# Patient Record
Sex: FEMALE | Race: WHITE | NOT HISPANIC OR LATINO | Employment: PART TIME | URBAN - METROPOLITAN AREA
[De-identification: names, ages, dates, MRNs, and addresses within clinical notes are randomized per-mention and may not be internally consistent; named-entity substitution may affect disease eponyms.]

---

## 2023-11-23 ENCOUNTER — APPOINTMENT (OUTPATIENT)
Dept: RADIOLOGY | Facility: HOSPITAL | Age: 63
End: 2023-11-23
Payer: COMMERCIAL

## 2023-11-23 ENCOUNTER — HOSPITAL ENCOUNTER (EMERGENCY)
Facility: HOSPITAL | Age: 63
Discharge: HOME | End: 2023-11-23
Payer: COMMERCIAL

## 2023-11-23 VITALS
HEART RATE: 54 BPM | WEIGHT: 160 LBS | SYSTOLIC BLOOD PRESSURE: 191 MMHG | RESPIRATION RATE: 16 BRPM | OXYGEN SATURATION: 96 % | TEMPERATURE: 97 F | HEIGHT: 67 IN | DIASTOLIC BLOOD PRESSURE: 91 MMHG | BODY MASS INDEX: 25.11 KG/M2

## 2023-11-23 DIAGNOSIS — I10 HYPERTENSION, UNSPECIFIED TYPE: ICD-10-CM
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
## 2023-11-23 DIAGNOSIS — M25.551 RIGHT HIP PAIN: ICD-10-CM

## 2023-11-23 DIAGNOSIS — M54.50 ACUTE RIGHT-SIDED LOW BACK PAIN WITHOUT SCIATICA: ICD-10-CM

## 2023-11-23 PROBLEM — I16.0 HYPERTENSIVE URGENCY: Status: ACTIVE | Noted: 2019-04-18

## 2023-11-23 PROCEDURE — 2500000001 HC RX 250 WO HCPCS SELF ADMINISTERED DRUGS (ALT 637 FOR MEDICARE OP): Performed by: NURSE PRACTITIONER

## 2023-11-23 PROCEDURE — 72100 X-RAY EXAM L-S SPINE 2/3 VWS: CPT | Performed by: RADIOLOGY

## 2023-11-23 PROCEDURE — 73502 X-RAY EXAM HIP UNI 2-3 VIEWS: CPT | Mod: RIGHT SIDE | Performed by: RADIOLOGY

## 2023-11-23 PROCEDURE — 72100 X-RAY EXAM L-S SPINE 2/3 VWS: CPT

## 2023-11-23 PROCEDURE — 99285 EMERGENCY DEPT VISIT HI MDM: CPT | Mod: 25

## 2023-11-23 PROCEDURE — 99284 EMERGENCY DEPT VISIT MOD MDM: CPT | Mod: 25

## 2023-11-23 PROCEDURE — 73502 X-RAY EXAM HIP UNI 2-3 VIEWS: CPT | Mod: RT

## 2023-11-23 RX ORDER — ACETAMINOPHEN 325 MG/1
650 TABLET ORAL ONCE
Status: COMPLETED | OUTPATIENT
Start: 2023-11-23 | End: 2023-11-23

## 2023-11-23 RX ORDER — IBUPROFEN 600 MG/1
600 TABLET ORAL ONCE
Status: COMPLETED | OUTPATIENT
Start: 2023-11-23 | End: 2023-11-23

## 2023-11-23 RX ORDER — NAPROXEN 375 MG/1
375 TABLET ORAL
Qty: 20 TABLET | Refills: 0 | Status: SHIPPED | OUTPATIENT
Start: 2023-11-23 | End: 2023-12-06 | Stop reason: WASHOUT

## 2023-11-23 RX ADMIN — ACETAMINOPHEN 650 MG: 325 TABLET ORAL at 18:39

## 2023-11-23 RX ADMIN — IBUPROFEN 600 MG: 600 TABLET, FILM COATED ORAL at 18:39

## 2023-11-23 ASSESSMENT — VISUAL ACUITY: OU: 1

## 2023-11-23 ASSESSMENT — COLUMBIA-SUICIDE SEVERITY RATING SCALE - C-SSRS
1. IN THE PAST MONTH, HAVE YOU WISHED YOU WERE DEAD OR WISHED YOU COULD GO TO SLEEP AND NOT WAKE UP?: NO
6. HAVE YOU EVER DONE ANYTHING, STARTED TO DO ANYTHING, OR PREPARED TO DO ANYTHING TO END YOUR LIFE?: NO
2. HAVE YOU ACTUALLY HAD ANY THOUGHTS OF KILLING YOURSELF?: NO

## 2023-11-23 NOTE — ED TRIAGE NOTES
Pt presents to ED after a fall 1 week ago for c/o right hip pain. Pt reports pain and difficulty walking. Pt reports popping sensation when walking up and down steps

## 2023-11-23 NOTE — ED PROVIDER NOTES
HPI   Chief Complaint   Patient presents with    Hip Pain       Patient is a 63-year-old female past medical history of hyperlipidemia, hypertension, and GERD, who presents ED today due to right hip pain.  Patient states she was sugar treating a few weeks ago and was bumped and tripped over a curb and fell onto her face and right side.  Patient has been complaining of right hip and back pain since that time but the pain became worse over the past few days so she wanted to get it evaluated.  Patient denies any distal numbness or weakness.  Patient denies any bowel or bladder retention.  Patient denies any saddle anesthesias.       used: No                        No data recorded                Patient History   Past Medical History:   Diagnosis Date    Hypertension      History reviewed. No pertinent surgical history.  No family history on file.  Social History     Tobacco Use    Smoking status: Every Day     Packs/day: .5     Types: Cigarettes    Smokeless tobacco: Never   Substance Use Topics    Alcohol use: Not Currently    Drug use: Never       Physical Exam   ED Triage Vitals [11/23/23 1825]   Temp Heart Rate Resp BP   36.6 °C (97.9 °F) 61 20 (!) 217/95      SpO2 Temp Source Heart Rate Source Patient Position   97 % Tympanic Monitor Sitting      BP Location FiO2 (%)     Right arm --       Physical Exam  Vitals and nursing note reviewed.   Constitutional:       General: She is not in acute distress.     Appearance: Normal appearance. She is not toxic-appearing or diaphoretic.      Interventions: Cervical collar in place.   HENT:      Head: Normocephalic and atraumatic. No raccoon eyes, Borges's sign, abrasion, contusion, right periorbital erythema, left periorbital erythema or laceration.      Jaw: No trismus, tenderness, swelling or malocclusion.      Right Ear: Tympanic membrane normal. No hemotympanum.      Left Ear: Tympanic membrane normal. No hemotympanum.      Nose: No nasal tenderness.       Right Nostril: No epistaxis or septal hematoma.      Left Nostril: No epistaxis or septal hematoma.      Mouth/Throat:      Mouth: Mucous membranes are moist. No injury.   Eyes:      General: Lids are normal. Vision grossly intact.      Extraocular Movements: Extraocular movements intact.      Right eye: Normal extraocular motion.      Left eye: Normal extraocular motion.      Conjunctiva/sclera:      Right eye: No hemorrhage.     Left eye: No hemorrhage.     Pupils: Pupils are equal, round, and reactive to light.   Cardiovascular:      Rate and Rhythm: Normal rate and regular rhythm.      Pulses:           Radial pulses are 2+ on the right side and 2+ on the left side.        Femoral pulses are 2+ on the right side and 2+ on the left side.       Dorsalis pedis pulses are 2+ on the right side and 2+ on the left side.        Posterior tibial pulses are 2+ on the right side and 2+ on the left side.   Pulmonary:      Effort: No respiratory distress.      Breath sounds: Normal breath sounds.   Abdominal:      General: Abdomen is flat.      Tenderness: There is no abdominal tenderness. There is no guarding or rebound.   Musculoskeletal:      Right shoulder: No swelling, deformity or tenderness.      Left shoulder: No swelling, deformity or tenderness.      Right upper arm: No swelling, deformity or tenderness.      Left upper arm: No swelling, deformity or tenderness.      Right elbow: No swelling or deformity. No tenderness.      Left elbow: No swelling or deformity. No tenderness.      Right forearm: No swelling, deformity or tenderness.      Left forearm: No swelling, deformity or tenderness.      Right wrist: No swelling, deformity or tenderness.      Left wrist: No swelling, deformity or tenderness.      Right hand: No swelling, deformity or tenderness.      Left hand: No swelling, deformity or tenderness.      Cervical back: No deformity, tenderness or bony tenderness. No spinous process tenderness.       Thoracic back: No deformity or bony tenderness.      Lumbar back: Tenderness (Right paraspinal plain of lumbar region) present. No deformity or bony tenderness. Negative right straight leg raise test and negative left straight leg raise test.      Right hip: Bony tenderness present. No deformity, tenderness or crepitus. Decreased range of motion. Normal strength.      Left hip: No deformity or tenderness.      Right upper leg: No deformity or tenderness.      Left upper leg: No deformity or tenderness.      Right knee: No deformity. No tenderness.      Left knee: No deformity. No tenderness.      Right lower leg: No deformity or tenderness.      Left lower leg: No deformity or tenderness.      Right ankle: No deformity. No tenderness.      Left ankle: No deformity. No tenderness.      Right foot: Normal capillary refill. No deformity or tenderness. Normal pulse.      Left foot: No deformity or tenderness.   Skin:     General: Skin is warm.      Findings: No abrasion or laceration.   Neurological:      Mental Status: She is alert.      GCS: GCS eye subscore is 4. GCS verbal subscore is 5. GCS motor subscore is 6.      Sensory: Sensation is intact.         ED Course & MDM   ED Course as of 11/23/23 2024   Thu Nov 23, 2023 2006 XR hip right 2 or 3 views  1. No acute fracture or dislocation.  2. Mild bilateral hip osteoarthritis.  3. High-density indeterminate sclerotic focus projected over the  right ischial tuberosity. Appearance suggests bone island.   [WS]   2006 XR lumbar spine 2-3 views  1. No acute osseous abnormality.  2. Mild-to-moderate multilevel spinal degenerative change.  3. 1 cm calcific density projected over the lower pole of the right kidney may reflect a urinary calculus.   [WS]      ED Course User Index  [WS] CHAVA Rizo-CNP         Diagnoses as of 11/23/23 2024   Fall, initial encounter   Right hip pain   Acute right-sided low back pain without sciatica   Hypertension, unspecified type        Medical Decision Making  Differential diagnosis: Contusion, hip fracture, pelvic fracture, lumbar fracture.  Patient's blood pressure is elevated, likely some of this is due to pain.  Patient be medicated for pain and her blood pressure will be rechecked.    Given patient's age and fall, I did consider obtaining a CT of patient's head and C-spine, she is having no neurologic symptoms and no neck pain and the injury happened over 3 weeks ago, these are likely low yield.  Patient's x-ray imaging does not show any evidence of acute fracture or dislocation or subluxation of the lumbar region or right hip.  Patient had moderate relief of pain with medication in the ED.  Patient prescribed anti-inflammatories for home and given a referral to orthopedic for follow-up.  Patient does not follow with a primary care doctor at this time and has not had her blood pressure medications although she does have history of high blood pressure.  Patient was found to be hypertensive on their vital signs.  Patient has no signs or symptoms of end organ damage.  Patient denies headache, dizziness, vision changes, focal numbness/weakness, chest pain, shortness of breath, or lower extremity swelling.  Patient was advised to follow up with their pcp to have their blood pressure rechecked.  Strict return to ED precautions were discussed and patient verbalized understanding of these.    I discussed the differential, results and discharge plan with the patient.  I emphasized the importance of follow-up with the physician I referred them to in the timeframe recommended.  I explained reasons for the them to return to the Emergency Department. Additional verbal discharge instructions were also given and discussed with them to supplement those generated by the EMR. We also discussed medications that were prescribed (if any) including common side effects and interactions. All questions were addressed.  They understand return precautions and  discharge instructions. They expressed understanding.        Amount and/or Complexity of Data Reviewed  Radiology: ordered and independent interpretation performed. Decision-making details documented in ED Course.    Risk  OTC drugs.  Prescription drug management.        Procedure  Procedures     CHAVA Rizo-DERIC  11/23/23 2025

## 2023-11-29 ENCOUNTER — OFFICE VISIT (OUTPATIENT)
Dept: ORTHOPEDIC SURGERY | Facility: CLINIC | Age: 63
End: 2023-11-29
Payer: COMMERCIAL

## 2023-11-29 VITALS — WEIGHT: 160 LBS | BODY MASS INDEX: 25.11 KG/M2 | HEIGHT: 67 IN

## 2023-11-29 DIAGNOSIS — M25.551 RIGHT HIP PAIN: ICD-10-CM

## 2023-11-29 DIAGNOSIS — S39.012A LOW BACK STRAIN, INITIAL ENCOUNTER: ICD-10-CM

## 2023-11-29 DIAGNOSIS — M70.61 TROCHANTERIC BURSITIS OF RIGHT HIP: Primary | ICD-10-CM

## 2023-11-29 PROCEDURE — 99203 OFFICE O/P NEW LOW 30 MIN: CPT | Performed by: FAMILY MEDICINE

## 2023-11-29 PROCEDURE — 20611 DRAIN/INJ JOINT/BURSA W/US: CPT | Performed by: FAMILY MEDICINE

## 2023-11-29 RX ORDER — TRIAMCINOLONE ACETONIDE 40 MG/ML
40 INJECTION, SUSPENSION INTRA-ARTICULAR; INTRAMUSCULAR
Status: COMPLETED | OUTPATIENT
Start: 2023-11-29 | End: 2023-11-29

## 2023-11-29 RX ORDER — LIDOCAINE HYDROCHLORIDE 20 MG/ML
2 INJECTION, SOLUTION INFILTRATION; PERINEURAL
Status: COMPLETED | OUTPATIENT
Start: 2023-11-29 | End: 2023-11-29

## 2023-11-29 RX ADMIN — LIDOCAINE HYDROCHLORIDE 2 ML: 20 INJECTION, SOLUTION INFILTRATION; PERINEURAL at 11:27

## 2023-11-29 RX ADMIN — TRIAMCINOLONE ACETONIDE 40 MG: 40 INJECTION, SUSPENSION INTRA-ARTICULAR; INTRAMUSCULAR at 11:27

## 2023-11-29 NOTE — PROGRESS NOTES
History of Present Illness   Chief Complaint   Patient presents with    Right Hip - Pain       The patient is 63 y.o. female  here with a complaint of right hip and low back pain.  Onset of symptoms approximately 1 month ago, says that she fell while trick or treating with her niece, says she actually landed more on her left knee, needed some help getting up, was able to walk/ambulate afterwards.  She says that over the past 2 weeks she has noticed more pain in the right lateral and posterior hip and low back, symptoms are exacerbated by prolonged standing, walking.  She was seen in the emergency department last week, she had x-rays of the hip and low back obtained, these were negative for fracture, showed some mild degenerative changes of both.  She was given a cane and prescription for naproxen, minimal change in symptoms with this.  She denies any radiation of pain further down the lower extremity, no numbness or tingling.  She denies any history of significant back or hip problems in the past.  Patient is retired.    Past Medical History:   Diagnosis Date    Hypertension        Medication Documentation Review Audit       Reviewed by Janet Tyson LPN (Licensed Nurse) on 11/29/23 at 1040      Medication Order Taking? Sig Documenting Provider Last Dose Status   naproxen (Naprosyn) 375 mg tablet [id number] Yes Take 1 tablet (375 mg) by mouth 2 times a day with meals for 10 days. CHAVA Rizo-CNP Taking Active                    Allergies   Allergen Reactions    Cefaclor Swelling and Hives    Cefuroxime Hives    Shellfish Derived Hives       Social History     Socioeconomic History    Marital status:      Spouse name: Not on file    Number of children: Not on file    Years of education: Not on file    Highest education level: Not on file   Occupational History    Not on file   Tobacco Use    Smoking status: Every Day     Packs/day: .5     Types: Cigarettes    Smokeless tobacco: Never    Substance and Sexual Activity    Alcohol use: Not Currently    Drug use: Never    Sexual activity: Not on file   Other Topics Concern    Not on file   Social History Narrative    Not on file     Social Determinants of Health     Financial Resource Strain: Not on file   Food Insecurity: Not on file   Transportation Needs: Not on file   Physical Activity: Not on file   Stress: Not on file   Social Connections: Not on file   Intimate Partner Violence: Not on file   Housing Stability: Not on file       No past surgical history on file.       Review of Systems   GENERAL: Negative  GI: Negative  MUSCULOSKELETAL: See HPI  SKIN: Negative  NEURO:  Negative     Physical Exam:    General/Constitutional: well appearing, no distress, appears stated age  HEENT: sclera clear  Respiratory: non labored breathing  Vascular: No edema, swelling or tenderness, except as noted in detailed exam.  Integumentary: No impressive skin lesions present, except as noted in detailed exam.  Neurological:  Alert and oriented   Psychological:  Normal mood and affect.  Musculoskeletal: Normal, except as noted in detailed exam and in HPI.  Antalgic gait with cane    Right hip: Normal appearance, no skin changes, no ecchymosis.  She has some tenderness to palpation somewhat diffusely at the posterior lateral hip, there is tenderness at the greater trochanter, there is tenderness along the gluteus medius and minimus, she has some SI joint tenderness posteriorly.  She has good range of motion at the hip with flexion, internal and external rotation with some exacerbation of posterior lateral hip pain.  There is pain and weakness, 3+ out of 5 with both resisted hip flexion and abduction.  She has pain with Stinchfield testing more laterally.    Lumbar spine: Normal appearance. No midline tenderness.  Positive right-sided lower lumbar paraspinal muscle tenderness as well as some right-sided SI joint tenderness.  Normal range of motion with flexion,  somewhat limited with extension with some pain, good mobility with bilateral twisting and side bending with some discomfort. negative straight leg testing bilaterally. Negative slump test.. 2+ patella and ankle jerk reflexes. Negative clonus. Sensation intact to light touch throughout bilateral lower extremity. No lower extremity motor deficits.     Imaging: X-rays of right hip and lumbar spine from 11/23/2023 independently reviewed, lumbar spine x-ray shows mild to moderate multilevel degenerative changes, hip x-ray with some mild osteoarthritis bilaterally, there is a sclerotic density within the ischial tuberosity consistent with bone island    L Inj/Asp: R greater trochanteric bursa on 11/29/2023 11:27 AM  Indications: pain  Details: 22 G needle, ultrasound-guided lateral approach  Medications: 40 mg triamcinolone acetonide 40 mg/mL; 2 mL lidocaine 20 mg/mL (2 %)  Outcome: tolerated well, no immediate complications  Procedure, treatment alternatives, risks and benefits explained, specific risks discussed. Consent was given by the patient. Immediately prior to procedure a time out was called to verify the correct patient, procedure, equipment, support staff and site/side marked as required. Patient was prepped and draped in the usual sterile fashion.             Assessment   1. Trochanteric bursitis of right hip  Referral to Physical Therapy      2. Low back strain, initial encounter  Referral to Physical Therapy      3. Right hip pain  Point of Care Ultrasound    Referral to Physical Therapy        Right posterior lateral hip pain, likely multifactorial in nature, does appear to have some degree of greater trochanteric pain syndrome conjoining, also likely has some SI joint dysfunction/low back strain    Plan: Discussed diagnosis, further work-up and treatment.  We did proceed with ultrasound-guided right hip trochanteric bursa injection with Kenalog, see procedure note for full details.  She was given a  referral to physical therapy for her back and hip to work on some range of motion, strengthening and stability, hopefully she will see good improvement in symptoms with this.  She will follow-up in 6 weeks for reassessment.

## 2023-12-05 PROBLEM — G56.01: Status: ACTIVE | Noted: 2022-04-06

## 2023-12-05 PROBLEM — M79.641 RIGHT HAND PAIN: Status: ACTIVE | Noted: 2022-05-10

## 2023-12-05 PROBLEM — S93.429A SPRAIN OF DELTOID LIGAMENT OF ANKLE: Status: ACTIVE | Noted: 2018-06-22

## 2023-12-05 PROBLEM — A49.8 KLEBSIELLA INFECTION: Status: ACTIVE | Noted: 2019-04-17

## 2023-12-05 PROBLEM — G56.11 RIGHT MEDIAN NERVE NEUROPATHY: Status: ACTIVE | Noted: 2022-03-08

## 2023-12-05 PROBLEM — N12 PYELONEPHRITIS: Status: ACTIVE | Noted: 2019-04-15

## 2023-12-05 RX ORDER — NORTRIPTYLINE HYDROCHLORIDE 25 MG/1
25 CAPSULE ORAL 2 TIMES DAILY
COMMUNITY
Start: 2022-03-14 | End: 2023-12-06 | Stop reason: WASHOUT

## 2023-12-05 RX ORDER — SIMVASTATIN 20 MG/1
TABLET, FILM COATED ORAL
COMMUNITY
End: 2023-12-06 | Stop reason: WASHOUT

## 2023-12-05 RX ORDER — METOPROLOL SUCCINATE 25 MG/1
TABLET, EXTENDED RELEASE ORAL
COMMUNITY
End: 2023-12-06 | Stop reason: WASHOUT

## 2023-12-05 RX ORDER — NITROGLYCERIN 80 MG/1
PATCH TRANSDERMAL
COMMUNITY
End: 2023-12-06 | Stop reason: WASHOUT

## 2023-12-05 RX ORDER — PANTOPRAZOLE SODIUM 40 MG/1
TABLET, DELAYED RELEASE ORAL
COMMUNITY
End: 2023-12-06 | Stop reason: WASHOUT

## 2023-12-05 RX ORDER — PHENYLPROPANOLAMINE/CLEMASTINE 75-1.34MG
TABLET, EXTENDED RELEASE ORAL
COMMUNITY

## 2023-12-05 RX ORDER — LORAZEPAM 2 MG/ML
CONCENTRATE ORAL
COMMUNITY
End: 2023-12-06 | Stop reason: WASHOUT

## 2023-12-06 ENCOUNTER — OFFICE VISIT (OUTPATIENT)
Dept: PRIMARY CARE | Facility: CLINIC | Age: 63
End: 2023-12-06
Payer: COMMERCIAL

## 2023-12-06 VITALS
BODY MASS INDEX: 23.62 KG/M2 | TEMPERATURE: 98.1 F | RESPIRATION RATE: 14 BRPM | SYSTOLIC BLOOD PRESSURE: 185 MMHG | DIASTOLIC BLOOD PRESSURE: 101 MMHG | OXYGEN SATURATION: 98 % | WEIGHT: 150.8 LBS | HEART RATE: 66 BPM

## 2023-12-06 DIAGNOSIS — I10 ESSENTIAL HYPERTENSION: ICD-10-CM

## 2023-12-06 DIAGNOSIS — M54.50 LUMBAR PAIN: ICD-10-CM

## 2023-12-06 DIAGNOSIS — M25.551 HIP PAIN, RIGHT: ICD-10-CM

## 2023-12-06 DIAGNOSIS — I49.8 SINUS ARRHYTHMIA SEEN ON ELECTROCARDIOGRAM: ICD-10-CM

## 2023-12-06 DIAGNOSIS — I10 BENIGN HYPERTENSION: ICD-10-CM

## 2023-12-06 DIAGNOSIS — Z76.89 ENCOUNTER TO ESTABLISH CARE: Primary | ICD-10-CM

## 2023-12-06 PROCEDURE — 99204 OFFICE O/P NEW MOD 45 MIN: CPT | Performed by: NURSE PRACTITIONER

## 2023-12-06 PROCEDURE — 3080F DIAST BP >= 90 MM HG: CPT | Performed by: NURSE PRACTITIONER

## 2023-12-06 PROCEDURE — 93005 ELECTROCARDIOGRAM TRACING: CPT | Performed by: NURSE PRACTITIONER

## 2023-12-06 PROCEDURE — 99214 OFFICE O/P EST MOD 30 MIN: CPT | Mod: ZK | Performed by: NURSE PRACTITIONER

## 2023-12-06 PROCEDURE — 4004F PT TOBACCO SCREEN RCVD TLK: CPT | Performed by: NURSE PRACTITIONER

## 2023-12-06 PROCEDURE — 93010 ELECTROCARDIOGRAM REPORT: CPT | Performed by: NURSE PRACTITIONER

## 2023-12-06 PROCEDURE — 3077F SYST BP >= 140 MM HG: CPT | Performed by: NURSE PRACTITIONER

## 2023-12-06 RX ORDER — LISINOPRIL AND HYDROCHLOROTHIAZIDE 10; 12.5 MG/1; MG/1
1 TABLET ORAL DAILY
Qty: 30 TABLET | Refills: 5 | Status: SHIPPED | OUTPATIENT
Start: 2023-12-06 | End: 2023-12-13 | Stop reason: ALTCHOICE

## 2023-12-06 RX ORDER — METHYLPREDNISOLONE 4 MG/1
TABLET ORAL
COMMUNITY
End: 2023-12-13 | Stop reason: ALTCHOICE

## 2023-12-06 RX ORDER — NAPROXEN 500 MG/1
TABLET ORAL
COMMUNITY
End: 2023-12-13 | Stop reason: ALTCHOICE

## 2023-12-06 SDOH — ECONOMIC STABILITY: FOOD INSECURITY: WITHIN THE PAST 12 MONTHS, YOU WORRIED THAT YOUR FOOD WOULD RUN OUT BEFORE YOU GOT MONEY TO BUY MORE.: NEVER TRUE

## 2023-12-06 SDOH — ECONOMIC STABILITY: FOOD INSECURITY: WITHIN THE PAST 12 MONTHS, THE FOOD YOU BOUGHT JUST DIDN'T LAST AND YOU DIDN'T HAVE MONEY TO GET MORE.: NEVER TRUE

## 2023-12-06 ASSESSMENT — ENCOUNTER SYMPTOMS
OCCASIONAL FEELINGS OF UNSTEADINESS: 1
LOSS OF SENSATION IN FEET: 0
DEPRESSION: 0

## 2023-12-06 ASSESSMENT — COLUMBIA-SUICIDE SEVERITY RATING SCALE - C-SSRS
6. HAVE YOU EVER DONE ANYTHING, STARTED TO DO ANYTHING, OR PREPARED TO DO ANYTHING TO END YOUR LIFE?: NO
1. IN THE PAST MONTH, HAVE YOU WISHED YOU WERE DEAD OR WISHED YOU COULD GO TO SLEEP AND NOT WAKE UP?: NO
2. HAVE YOU ACTUALLY HAD ANY THOUGHTS OF KILLING YOURSELF?: NO

## 2023-12-06 ASSESSMENT — PATIENT HEALTH QUESTIONNAIRE - PHQ9
SUM OF ALL RESPONSES TO PHQ9 QUESTIONS 1 AND 2: 0
1. LITTLE INTEREST OR PLEASURE IN DOING THINGS: NOT AT ALL
2. FEELING DOWN, DEPRESSED OR HOPELESS: NOT AT ALL

## 2023-12-06 NOTE — PROGRESS NOTES
Subjective   Patient ID: Deanne Gerbracht is a 63 y.o. female who presents for No chief complaint on file..  HPI 63-year-old female with past medical history of hypertension, hyperlipidemia, history of palpitations, GERD, anxiety and smoking history presents today to establish care.    Upon presentation, she is hypertensive.  Blood pressure 180/95  Heart rate 62.  No chest pain or palpitations.  She is currently out of her blood pressure medications.  She was concerned regarding her hypertension and starting the Medrol Dosepak that she was given for her right sciatica.  Ortho is following.  Presently with 3 out of 10 discomfort with no radiation.    Down to 1/2 ppd - declines smoking cessation program.     Ortho - following for hip and back pain  Not able to handle PT.   Pain 8/10  Back down right thigh.     Hip x ray right 11/23/2023  Mild bilateral hip osteoarthritis.  High-density indeterminate sclerotic focus projected over the  right ischial tuberosity. Appearance suggests bone island.    Lumbar spine Xray 11/23/2023  Mild-to-moderate multilevel spinal degenerative change.  1 cm calcific density projected over the lower pole of the right  kidney may reflect a urinary calculus.  There is mild multilevel endplate osteophyte formation  and disc space narrowing. Is moderate lower lumbar facet arthropathy  most pronounced at L4-L5 and L5-S1. Vertebral body heights are  preserved. No acute fracture or subluxation. 1 cm calcific density  projected over the lower pole of the right kidney may reflect a  urinary calculus. Diffuse osteopenia limits evaluation for subtle  fractures.    Review of Systems  Review of systems: Present-feeling well. Not present-chills, fatigue and fever.  Skin: Not present-new lesions and rash.  HEENT: Not present-headache, ear pain, nasal congestion, and sore throat.  Neck: Not present-neck pain, neck stiffness and swollen glands.  Respiratory: Not present-difficulty breathing, cough, bloody  sputum.  Cardiovascular: Hypertensive.  Not present- chest pain, edema, fainting, leg pain, leg swelling, palpitations, dyspnea on exertion.  Gastrointestinal: Not present-abdominal pain, bloody or very black stools, jaundice, nausea and vomiting.  Genitourinary: Not present-change in bladder habits, hematuria, flank pain, frequency, urgency and dysuria.  Musculoskeletal: Right sciatica followed by Ortho.  Neurological: Not present-dizziness, headache, numbness or tingling.  Psychiatric: Not present- suicidal ideation, suicidal planning, thoughts of hurting others and thoughts of self-harm.    Objective   There were no vitals taken for this visit.     Physical Exam  Gen.: Mental status-alert. Gen. appearance-cooperative, well groomed and consistent with stated age. Not in acute distress or sickly. Orientation-oriented to time, place, purpose and person. Build and nutrition-well-nourished and well-developed. Hydration-well-hydrated.    Integumentary: Color-normal coloration skin. Skin moisture-normal skin moisture.    Head and neck: Head-normocephalic, atraumatic with no lesions or palpable masses. Face-atraumatic. Neck-full range of motion and subtle. No lymphadenopathy and no nuchal rigidity. Thyroid-normal size and consistency with no palpable lumps.    Chest and lung exam: Auscultation-normal breath sounds, no adventitious lung sounds and normal vocal resonance. Chest wall is normal in shape and non-tender.    Cardiovascular: Auscultation: Regular rate and rhythm. Heart sounds-normal heart sounds, S1-S2. No murmurs or gallops appreciated. Carotid arteries normal and without bruit.    Abdomen: Non-tender, no rigidity, and no palpable masses. There is no hepatosplenomegaly. Auscultation-auscultation of the abdomen reveals normal bowel sounds throughout.     Peripheral vascular: Lower extremity-inspection is normal bilaterally. Normal temperature and no edema bilaterally.    Neurologic: Mental  [id number]. Cranial nerves: Cranial nerves II through XII grossly intact. Normal gait.    Musculoskeletal: Examination of the spine with no step-offs or point tenderness.  Full range of motion of the spine without pain or difficulty. Right lower extremity-normal strength and tone, normal range of motion without pain. Left lower extremity-normal strength and tone, normal range of motion without pain.  Assessment/Plan   Diagnoses and all orders for this visit:  Encounter to establish care  Lumbar pain  -     Referral to Pain Medicine; Future  Hip pain, right  -     Referral to Pain Medicine; Future  Essential hypertension  -     ECG 12 lead (Clinic Performed)  -     Referral to Cardiology; Future  -     lisinopriL-hydrochlorothiazide 10-12.5 mg tablet; Take 1 tablet by mouth once daily.  -     CBC; Future  -     Comprehensive Metabolic Panel; Future  -     Lipid Panel; Future  -     TSH with reflex to Free T4 if abnormal; Future  Sinus arrhythmia seen on electrocardiogram  -     Referral to Cardiology; Future  Essential hypertension

## 2023-12-06 NOTE — PATIENT INSTRUCTIONS
New patient - establish care.    Patient to be hypertensive and needing refills on her blood pressure medication.   She states she has not been on medication for about a year.   She states she did have a history of palpitations.    EKG - Sinus arrhythmia then converted to NSR.         Hypertension -Start lisinopril 10 mg-hydrochlorothiazide 12.5 mg.  DASH diet.  Patient to monitor her blood pressure.  Contact office with blood pressure readings greater than 160/90 or less than 90/60.    Follow-up up in 1 to 2 weeks or sooner as needed.  Fasting labs to be obtained.    Patient will be notified of results as they become available.  Referred to Cardiology     Right hip and back pain - ortho following.   Start Medrol dose ellie today for right bursitis discomfort.  Patient is doing referral to pain management.    Down to 1/2 ppd - declines smoking cessation program.

## 2023-12-12 ENCOUNTER — LAB (OUTPATIENT)
Dept: LAB | Facility: LAB | Age: 63
End: 2023-12-12
Payer: COMMERCIAL

## 2023-12-12 DIAGNOSIS — I10 BENIGN HYPERTENSION: ICD-10-CM

## 2023-12-12 LAB
ALBUMIN SERPL BCP-MCNC: 4.6 G/DL (ref 3.4–5)
ALP SERPL-CCNC: 64 U/L (ref 33–136)
ALT SERPL W P-5'-P-CCNC: 16 U/L (ref 7–45)
ANION GAP SERPL CALC-SCNC: 11 MMOL/L (ref 10–20)
AST SERPL W P-5'-P-CCNC: 14 U/L (ref 9–39)
BILIRUB SERPL-MCNC: 0.5 MG/DL (ref 0–1.2)
BUN SERPL-MCNC: 20 MG/DL (ref 6–23)
CALCIUM SERPL-MCNC: 9.7 MG/DL (ref 8.6–10.3)
CHLORIDE SERPL-SCNC: 104 MMOL/L (ref 98–107)
CHOLEST SERPL-MCNC: 237 MG/DL (ref 0–199)
CHOLESTEROL/HDL RATIO: 4.1
CO2 SERPL-SCNC: 30 MMOL/L (ref 21–32)
CREAT SERPL-MCNC: 0.63 MG/DL (ref 0.5–1.05)
ERYTHROCYTE [DISTWIDTH] IN BLOOD BY AUTOMATED COUNT: 13.2 % (ref 11.5–14.5)
GFR SERPL CREATININE-BSD FRML MDRD: >90 ML/MIN/1.73M*2
GLUCOSE SERPL-MCNC: 89 MG/DL (ref 74–99)
HCT VFR BLD AUTO: 45.1 % (ref 36–46)
HDLC SERPL-MCNC: 57.4 MG/DL
HGB BLD-MCNC: 14.7 G/DL (ref 12–16)
LDLC SERPL CALC-MCNC: 144 MG/DL
MCH RBC QN AUTO: 32.6 PG (ref 26–34)
MCHC RBC AUTO-ENTMCNC: 32.6 G/DL (ref 32–36)
MCV RBC AUTO: 100 FL (ref 80–100)
NON HDL CHOLESTEROL: 180 MG/DL (ref 0–149)
NRBC BLD-RTO: 0 /100 WBCS (ref 0–0)
PLATELET # BLD AUTO: 223 X10*3/UL (ref 150–450)
POTASSIUM SERPL-SCNC: 4.6 MMOL/L (ref 3.5–5.3)
PROT SERPL-MCNC: 7 G/DL (ref 6.4–8.2)
RBC # BLD AUTO: 4.51 X10*6/UL (ref 4–5.2)
SODIUM SERPL-SCNC: 140 MMOL/L (ref 136–145)
TRIGL SERPL-MCNC: 177 MG/DL (ref 0–149)
TSH SERPL-ACNC: 3.83 MIU/L (ref 0.44–3.98)
VLDL: 35 MG/DL (ref 0–40)
WBC # BLD AUTO: 12.5 X10*3/UL (ref 4.4–11.3)

## 2023-12-12 PROCEDURE — 80053 COMPREHEN METABOLIC PANEL: CPT

## 2023-12-12 PROCEDURE — 85027 COMPLETE CBC AUTOMATED: CPT

## 2023-12-12 PROCEDURE — 84443 ASSAY THYROID STIM HORMONE: CPT

## 2023-12-12 PROCEDURE — 36415 COLL VENOUS BLD VENIPUNCTURE: CPT

## 2023-12-12 PROCEDURE — 80061 LIPID PANEL: CPT

## 2023-12-13 ENCOUNTER — OFFICE VISIT (OUTPATIENT)
Dept: PRIMARY CARE | Facility: CLINIC | Age: 63
End: 2023-12-13
Payer: COMMERCIAL

## 2023-12-13 VITALS
BODY MASS INDEX: 23.57 KG/M2 | SYSTOLIC BLOOD PRESSURE: 166 MMHG | WEIGHT: 150.2 LBS | RESPIRATION RATE: 16 BRPM | TEMPERATURE: 98.1 F | DIASTOLIC BLOOD PRESSURE: 94 MMHG | HEART RATE: 66 BPM | OXYGEN SATURATION: 99 % | HEIGHT: 67 IN

## 2023-12-13 DIAGNOSIS — R09.81 CONGESTION OF NASAL SINUS: ICD-10-CM

## 2023-12-13 DIAGNOSIS — I10 ESSENTIAL HYPERTENSION: ICD-10-CM

## 2023-12-13 DIAGNOSIS — E78.5 HYPERLIPIDEMIA LDL GOAL <100: ICD-10-CM

## 2023-12-13 DIAGNOSIS — R42 VERTIGO: Primary | ICD-10-CM

## 2023-12-13 LAB
FLUAV RNA RESP QL NAA+PROBE: NOT DETECTED
FLUBV RNA RESP QL NAA+PROBE: NOT DETECTED
SARS-COV-2 RNA RESP QL NAA+PROBE: NOT DETECTED

## 2023-12-13 PROCEDURE — 3077F SYST BP >= 140 MM HG: CPT | Performed by: NURSE PRACTITIONER

## 2023-12-13 PROCEDURE — 99214 OFFICE O/P EST MOD 30 MIN: CPT | Performed by: NURSE PRACTITIONER

## 2023-12-13 PROCEDURE — 87636 SARSCOV2 & INF A&B AMP PRB: CPT | Mod: ZK | Performed by: NURSE PRACTITIONER

## 2023-12-13 PROCEDURE — 3080F DIAST BP >= 90 MM HG: CPT | Performed by: NURSE PRACTITIONER

## 2023-12-13 PROCEDURE — 99214 OFFICE O/P EST MOD 30 MIN: CPT | Mod: ZK | Performed by: NURSE PRACTITIONER

## 2023-12-13 PROCEDURE — 4004F PT TOBACCO SCREEN RCVD TLK: CPT | Performed by: NURSE PRACTITIONER

## 2023-12-13 RX ORDER — MECLIZINE HCL 12.5 MG 12.5 MG/1
12.5 TABLET ORAL 3 TIMES DAILY PRN
Qty: 30 TABLET | Refills: 11 | Status: SHIPPED | OUTPATIENT
Start: 2023-12-13 | End: 2024-01-03 | Stop reason: SDUPTHER

## 2023-12-13 RX ORDER — ATORVASTATIN CALCIUM 10 MG/1
10 TABLET, FILM COATED ORAL DAILY
Qty: 30 TABLET | Refills: 5 | Status: SHIPPED | OUTPATIENT
Start: 2023-12-13 | End: 2024-06-10

## 2023-12-13 RX ORDER — LISINOPRIL 20 MG/1
20 TABLET ORAL DAILY
Qty: 30 TABLET | Refills: 0 | Status: SHIPPED | OUTPATIENT
Start: 2023-12-13 | End: 2024-01-03 | Stop reason: SDUPTHER

## 2023-12-13 SDOH — ECONOMIC STABILITY: FOOD INSECURITY: WITHIN THE PAST 12 MONTHS, THE FOOD YOU BOUGHT JUST DIDN'T LAST AND YOU DIDN'T HAVE MONEY TO GET MORE.: NEVER TRUE

## 2023-12-13 SDOH — ECONOMIC STABILITY: FOOD INSECURITY: WITHIN THE PAST 12 MONTHS, YOU WORRIED THAT YOUR FOOD WOULD RUN OUT BEFORE YOU GOT MONEY TO BUY MORE.: NEVER TRUE

## 2023-12-13 ASSESSMENT — ENCOUNTER SYMPTOMS
LOSS OF SENSATION IN FEET: 1
DEPRESSION: 0
OCCASIONAL FEELINGS OF UNSTEADINESS: 1

## 2023-12-13 ASSESSMENT — PATIENT HEALTH QUESTIONNAIRE - PHQ9
2. FEELING DOWN, DEPRESSED OR HOPELESS: NOT AT ALL
1. LITTLE INTEREST OR PLEASURE IN DOING THINGS: NOT AT ALL
SUM OF ALL RESPONSES TO PHQ9 QUESTIONS 1 AND 2: 0

## 2023-12-13 ASSESSMENT — COLUMBIA-SUICIDE SEVERITY RATING SCALE - C-SSRS
2. HAVE YOU ACTUALLY HAD ANY THOUGHTS OF KILLING YOURSELF?: NO
1. IN THE PAST MONTH, HAVE YOU WISHED YOU WERE DEAD OR WISHED YOU COULD GO TO SLEEP AND NOT WAKE UP?: NO
6. HAVE YOU EVER DONE ANYTHING, STARTED TO DO ANYTHING, OR PREPARED TO DO ANYTHING TO END YOUR LIFE?: NO

## 2023-12-13 ASSESSMENT — PAIN SCALES - GENERAL: PAINLEVEL: 6

## 2023-12-13 NOTE — PROGRESS NOTES
"Subjective   Patient ID: Deanne Gerbracht is a 63 y.o. female who presents for Hypertension (FUV for hypertension.).  HPI this 63-year-old female presents today for blood pressure check.  Patient states blood pressure in the 140s over 90s at home.    She is feeling fatigue, nasal congestion and intermittent bouts of feeling the room is spinning.  This is not present on exam today.      Reviewed with patient recent labs.    Review of Systems  Review of systems: Present-feeling well. Not present-chills, fatigue and fever.  Skin: Not present-change in wart/mole, dryness, hives, new lesions and rash.  HEENT: Nasal congestion and questionable vertigo type symptoms.  Not present-headache, ear pain and sore throat.  Neck: Not present-neck pain, neck stiffness and swollen glands.  Respiratory: Not present-difficulty breathing, cough, bloody sputum.  Cardiovascular: Not present-chest pain, palpitations, dyspnea on exertion.  Gastrointestinal: Not present-abdominal pain, bloody or very black stools, heartburn, jaundice, nausea and vomiting.  Genitourinary: Not present-hematuria, flank pain and dysuria.  Musculoskeletal: Not present-joint swelling, joint redness.    Objective   BP (!) 166/94 (BP Location: Right arm, Patient Position: Sitting, BP Cuff Size: Adult)   Pulse 66   Temp 36.7 °C (98.1 °F) (Temporal)   Resp 16   Ht 1.702 m (5' 7\")   Wt 68.1 kg (150 lb 3.2 oz)   SpO2 99%   BMI 23.52 kg/m²    Patient took her blood pressure medication prior to exam - 5 hours.  Physical Exam  Gen.: Mental status-alert. Gen. appearance-cooperative, well groomed and consistent with stated age. Not in acute distress or sickly. Orientation-oriented to time, place, purpose and person. Build and nutrition-well-nourished and well-developed. Hydration-well-hydrated.    Integumentary: Color-normal coloration skin. Skin moisture-normal skin moisture.    Head and neck: Head-normocephalic, atraumatic with no lesions or palpable masses. " Face-atraumatic. Neck-full range of motion and subtle. No lymphadenopathy, no palpable masses on the right, no palpable masses on the left and no nuchal rigidity.     ENMT: Ears-no tenderness noted to the external auditory canal-bilaterally. Otoscopic exam: Tympanic membranes-left-tympanic membrane is gray in appearance. Right-tympanic membrane is gray in appearance. Nose -frontal sinuses-non-tender and no purulent drainage. Maxillary sinuses-tender and no purulent drainage. Mouth: Oral mucosa-pink and moist. Oropharynx: No airway distress, bulging of the pharyngeal wall, edema of the uvula, and no pharyngeal erythema.    Chest and lung exam: Auscultation-normal breath sounds, no adventitious lung sounds and normal vocal resonance. Chest wall is normal in shape and non-tender.    Cardiovascular: Auscultation: Regular rate and rhythm. Heart sounds-normal heart sounds, S1-S2. No murmurs or gallops appreciated. Carotid arteries normal and without bruit.    Abdomen: Inspection-inspection of the abdomen reveals no hernias. Palpation/percussion: Palpation and percussion of the abdomen reveal-non-tender, no rigidity, and no palpable masses. There is no hepatosplenomegaly. Auscultation-auscultation of the abdomen reveals normal bowel sounds throughout.   Peripheral vascular: Lower extremity-inspection is normal bilaterally. Normal temperature and no edema bilaterally.    Musculoskeletal: Examination of the spine with no step-offs or point tenderness.  Full range of motion of the spine without pain or difficulty. Right lower extremity-normal strength and tone, normal range of motion without pain. Left lower extremity-normal strength and tone, normal range of motion without pain.  Assessment/Plan   Vertigo  -     meclizine (Antivert) 12.5 mg tablet; Take 1 tablet (12.5 mg) by mouth 3 times a day as needed for dizziness.  Essential hypertension  -     lisinopril 20 mg tablet; Take 1 tablet (20 mg) by mouth once  daily.  Congestion of nasal sinus  -     Sars-CoV-2 and Influenza A/B PCR  Hyperlipidemia LDL goal <100  -     atorvastatin (Lipitor) 10 mg tablet; Take 1 tablet (10 mg) by mouth once daily.

## 2023-12-13 NOTE — PATIENT INSTRUCTIONS
Hypertension under better control today though not at goal.  Hydrochlorothiazide - ? Leg cramps, dry mouth and dizziness per patient.  Change to lisinopril 20 mg daily.  Continue to monitor blood pressure.  Contact office with blood pressure readings greater than 150/90 or less than 90/60.  Hydrate.  She is not orthostatic.  Vertigo type symptoms-start meclizine as directed.  Contact office with worsening condition or no resolve over the next few days.  COVID and flu test obtained.  She will be notified of results as they become available.    Hyperlipidemia-atorvastatin 10 mg daily.  LDL goal less than 100.  Continue with low-fat diet and exercise.  Follow-up in 3 months or sooner as needed.    Follow-up 1 month for blood pressure check.

## 2024-01-03 ENCOUNTER — OFFICE VISIT (OUTPATIENT)
Dept: PRIMARY CARE | Facility: CLINIC | Age: 64
End: 2024-01-03
Payer: COMMERCIAL

## 2024-01-03 VITALS
TEMPERATURE: 97.2 F | OXYGEN SATURATION: 97 % | RESPIRATION RATE: 16 BRPM | WEIGHT: 145.2 LBS | DIASTOLIC BLOOD PRESSURE: 89 MMHG | HEIGHT: 67 IN | HEART RATE: 72 BPM | BODY MASS INDEX: 22.79 KG/M2 | SYSTOLIC BLOOD PRESSURE: 150 MMHG

## 2024-01-03 DIAGNOSIS — Z13.89 SCREENING FOR BLOOD OR PROTEIN IN URINE: ICD-10-CM
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
## 2024-01-03 DIAGNOSIS — F17.200 CURRENT EVERY DAY SMOKER: ICD-10-CM

## 2024-01-03 DIAGNOSIS — M25.561 ACUTE PAIN OF RIGHT KNEE: ICD-10-CM

## 2024-01-03 DIAGNOSIS — I10 ESSENTIAL HYPERTENSION: Primary | ICD-10-CM

## 2024-01-03 DIAGNOSIS — D72.828 OTHER ELEVATED WHITE BLOOD CELL COUNT: ICD-10-CM

## 2024-01-03 DIAGNOSIS — R42 VERTIGO: ICD-10-CM

## 2024-01-03 LAB
POC APPEARANCE, URINE: CLEAR
POC BILIRUBIN, URINE: NEGATIVE
POC BLOOD, URINE: NEGATIVE
POC COLOR, URINE: YELLOW
POC GLUCOSE, URINE: NEGATIVE MG/DL
POC KETONES, URINE: NEGATIVE MG/DL
POC LEUKOCYTES, URINE: NEGATIVE
POC NITRITE,URINE: NEGATIVE
POC PH, URINE: 6.5 PH
POC PROTEIN, URINE: ABNORMAL MG/DL
POC SPECIFIC GRAVITY, URINE: 1.02
POC UROBILINOGEN, URINE: 1 EU/DL

## 2024-01-03 PROCEDURE — 3079F DIAST BP 80-89 MM HG: CPT | Performed by: NURSE PRACTITIONER

## 2024-01-03 PROCEDURE — 3077F SYST BP >= 140 MM HG: CPT | Performed by: NURSE PRACTITIONER

## 2024-01-03 PROCEDURE — 81003 URINALYSIS AUTO W/O SCOPE: CPT | Performed by: NURSE PRACTITIONER

## 2024-01-03 PROCEDURE — 99214 OFFICE O/P EST MOD 30 MIN: CPT | Mod: ZK | Performed by: NURSE PRACTITIONER

## 2024-01-03 PROCEDURE — 99214 OFFICE O/P EST MOD 30 MIN: CPT | Performed by: NURSE PRACTITIONER

## 2024-01-03 RX ORDER — LISINOPRIL 40 MG/1
40 TABLET ORAL DAILY
Qty: 30 TABLET | Refills: 0 | Status: SHIPPED | OUTPATIENT
Start: 2024-01-03 | End: 2024-04-10 | Stop reason: SDUPTHER

## 2024-01-03 RX ORDER — MECLIZINE HCL 12.5 MG 12.5 MG/1
12.5 TABLET ORAL 3 TIMES DAILY PRN
Qty: 30 TABLET | Refills: 3 | Status: SHIPPED | OUTPATIENT
Start: 2024-01-03 | End: 2025-01-02

## 2024-01-03 SDOH — ECONOMIC STABILITY: FOOD INSECURITY: WITHIN THE PAST 12 MONTHS, THE FOOD YOU BOUGHT JUST DIDN'T LAST AND YOU DIDN'T HAVE MONEY TO GET MORE.: NEVER TRUE

## 2024-01-03 SDOH — ECONOMIC STABILITY: FOOD INSECURITY: WITHIN THE PAST 12 MONTHS, YOU WORRIED THAT YOUR FOOD WOULD RUN OUT BEFORE YOU GOT MONEY TO BUY MORE.: NEVER TRUE

## 2024-01-03 ASSESSMENT — ENCOUNTER SYMPTOMS
OCCASIONAL FEELINGS OF UNSTEADINESS: 1
DEPRESSION: 0
LOSS OF SENSATION IN FEET: 0

## 2024-01-03 ASSESSMENT — PATIENT HEALTH QUESTIONNAIRE - PHQ9
SUM OF ALL RESPONSES TO PHQ9 QUESTIONS 1 AND 2: 0
2. FEELING DOWN, DEPRESSED OR HOPELESS: NOT AT ALL
1. LITTLE INTEREST OR PLEASURE IN DOING THINGS: NOT AT ALL

## 2024-01-03 ASSESSMENT — PAIN SCALES - GENERAL: PAINLEVEL: 8

## 2024-01-03 NOTE — PATIENT INSTRUCTIONS
Hypertension -increase lisinopril to 40 mg daily.  Encouraged DASH diet.  Monitor salt intake.  Low-fat diet.  Continue to monitor blood pressure at home.  Contact office with blood pressure readings greater than 150/90 or less than 90/60.  Review of recent labs with elevated wbc count - repeat today.  Patient denies fever, chills, nausea, vomiting or diarrhea.    Right knee pain s/p fall earlier today.  Slipped on ice.   X -ray to be obtained.  Patient to Ace Wrap knee.  Keep elevated while at rest.  Ice, Nsaids/tylenol.  Contact office with worsening condition.   Follow up in 1-2 weeks or sooner as needed.       Vertigo - Refill meclizine as directed.  Contact office with worsening condition.  Mild intermittent symptoms not present on todays exam or cause of patient fall.        1/2 ppd encouraged to quit.  Referred to smoking cessation program.     UA trace protein - will repeat next visit with microalbumin.   In the process of adjusting meds for bp    AE , pap and bp check 2 weeks.

## 2024-01-03 NOTE — PROGRESS NOTES
"Subjective   Patient ID: Deanne Gerbracht is a 63 y.o. female who presents for Follow-up (FUV-  hypertension and to discuss blood work results.) and Hypertension.  HPI 63-year-old female presents today for blood pressure check and review of recent labs.  She states blood pressures have been in 140-150/ at home.    Needs to follow up with Cardiology.     Fell on ice this am.   Right knee.   No LOC, lightheadedness or dizziness.   Tenderness anterior and medial right knee.  No redness or swelling.      Review of Systems  Review of systems: Present-feeling well. Not present-chills, fatigue and fever.  Skin: Not present-new lesions and rash.  HEENT: Not present-headache, ear pain, nasal congestion, and sore throat.  Neck: Not present-neck pain, neck stiffness and swollen glands.  Respiratory: Not present-difficulty breathing, cough, bloody sputum.  Cardiovascular: Hypertension.  Not present-chest pain, palpitations or dyspnea on exertion.  Gastrointestinal: Not present-abdominal pain, bloody or very black stools, changes in bowel habits, jaundice, nausea and vomiting.  Genitourinary: Not present-change in bladder habits, hematuria, flank pain and dysuria.  Musculoskeletal: See HPI.    Neurological: Not present-dizziness, headache.  Objective   /89 (BP Location: Right arm, Patient Position: Sitting, BP Cuff Size: Adult)   Pulse 72   Temp 36.2 °C (97.2 °F) (Temporal)   Resp 16   Ht 1.702 m (5' 7\")   Wt 65.9 kg (145 lb 3.2 oz)   SpO2 97%   BMI 22.74 kg/m²    Took blood pressure medication at 8:30 am.      Physical Exam  Gen.: Mental status-alert. Gen. appearance-cooperative, well groomed and consistent with stated age. Not in acute distress or sickly. Orientation-oriented to time, place, purpose and person. Build and nutrition-well-nourished and well-developed. Hydration-well-hydrated.    Integumentary: Color-normal coloration skin. Skin moisture-normal skin moisture.    Head and neck: " Head-normocephalic, atraumatic with no lesions or palpable masses. Face-atraumatic. Neck-full range of motion and subtle. No lymphadenopathy and no nuchal rigidity.     Chest and lung exam:  Auscultation-normal breath sounds, no adventitious lung sounds and normal vocal resonance. Chest wall is normal in shape and non-tender.    Cardiovascular: Auscultation: Regular rate and rhythm. Heart sounds-normal heart sounds, S1-S2. No murmurs or gallops appreciated. Carotid arteries normal and without bruit.    Abdomen: Non-tender, no rigidity, and no palpable masses. There is no hepatosplenomegaly. Auscultation-auscultation of the abdomen reveals normal bowel sounds throughout.     Peripheral vascular: Lower extremity-inspection is normal bilaterally. Normal temperature and no edema bilaterally.    Neurologic: Mental mzezur-gtxnif-mlzssmoxxms. Cranial nerves: Cranial nerves II through XII grossly intact. Normal gait.    Musculoskeletal: Examination of the spine with no step-offs or point tenderness.  Full range of motion of the spine without pain or difficulty.   Right lower extremity-normal strength and tone, normal range of motion without pain.  Right knee with tenderness anteriorly and medially.   No redness or swelling.   Full range of motion of knee without crepitus.   Negative anterior/posterior drawer test.   Left lower extremity-normal strength and tone, normal range of motion without pain.  Assessment/Plan   Diagnoses and all orders for this visit:  Essential hypertension  -     lisinopril 40 mg tablet; Take 1 tablet (40 mg) by mouth once daily.  Acute pain of right knee  -     XR knee right 4+ views; Future  Fall due to slipping on ice or snow, initial encounter  -     XR knee right 4+ views; Future  Other elevated white blood cell count  -     CBC; Future  Vertigo  -     meclizine (Antivert) 12.5 mg tablet; Take 1 tablet (12.5 mg) by mouth 3 times a day as needed for dizziness.  Current every day smoker  -      Referral to Tobacco Cessation Counseling; Future  Screening for blood or protein in urine  -     POCT UA Automated manually resulted

## 2024-01-05 ENCOUNTER — HOSPITAL ENCOUNTER (OUTPATIENT)
Dept: RADIOLOGY | Facility: HOSPITAL | Age: 64
Discharge: HOME | End: 2024-01-05
Payer: COMMERCIAL

## 2024-01-05 DIAGNOSIS — M25.561 ACUTE PAIN OF RIGHT KNEE: ICD-10-CM
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
## 2024-01-05 PROCEDURE — 73564 X-RAY EXAM KNEE 4 OR MORE: CPT | Mod: RT

## 2024-01-05 PROCEDURE — 73564 X-RAY EXAM KNEE 4 OR MORE: CPT | Mod: RIGHT SIDE | Performed by: RADIOLOGY

## 2024-01-10 ENCOUNTER — APPOINTMENT (OUTPATIENT)
Dept: ORTHOPEDIC SURGERY | Facility: CLINIC | Age: 64
End: 2024-01-10
Payer: COMMERCIAL

## 2024-01-31 ENCOUNTER — OFFICE VISIT (OUTPATIENT)
Dept: PRIMARY CARE | Facility: CLINIC | Age: 64
End: 2024-01-31
Payer: COMMERCIAL

## 2024-01-31 VITALS
HEIGHT: 67 IN | OXYGEN SATURATION: 98 % | TEMPERATURE: 98.5 F | WEIGHT: 144.4 LBS | DIASTOLIC BLOOD PRESSURE: 87 MMHG | SYSTOLIC BLOOD PRESSURE: 158 MMHG | BODY MASS INDEX: 22.66 KG/M2 | RESPIRATION RATE: 16 BRPM | HEART RATE: 72 BPM

## 2024-01-31 DIAGNOSIS — M25.551 RIGHT HIP PAIN: ICD-10-CM

## 2024-01-31 DIAGNOSIS — R42 LIGHT HEADEDNESS: ICD-10-CM

## 2024-01-31 DIAGNOSIS — I10 BENIGN HYPERTENSION: ICD-10-CM
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
## 2024-01-31 DIAGNOSIS — M25.561 PAIN IN LATERAL PORTION OF RIGHT KNEE: ICD-10-CM

## 2024-01-31 LAB — POC FINGERSTICK BLOOD GLUCOSE: 92 MG/DL (ref 70–100)

## 2024-01-31 PROCEDURE — 99214 OFFICE O/P EST MOD 30 MIN: CPT | Mod: ZK | Performed by: NURSE PRACTITIONER

## 2024-01-31 PROCEDURE — 99214 OFFICE O/P EST MOD 30 MIN: CPT | Performed by: NURSE PRACTITIONER

## 2024-01-31 PROCEDURE — 3077F SYST BP >= 140 MM HG: CPT | Performed by: NURSE PRACTITIONER

## 2024-01-31 PROCEDURE — 82962 GLUCOSE BLOOD TEST: CPT | Mod: ZK | Performed by: NURSE PRACTITIONER

## 2024-01-31 PROCEDURE — 3079F DIAST BP 80-89 MM HG: CPT | Performed by: NURSE PRACTITIONER

## 2024-01-31 RX ORDER — AMLODIPINE BESYLATE 5 MG/1
5 TABLET ORAL DAILY
Qty: 30 TABLET | Refills: 5 | Status: SHIPPED | OUTPATIENT
Start: 2024-01-31 | End: 2024-07-29

## 2024-01-31 SDOH — ECONOMIC STABILITY: FOOD INSECURITY: WITHIN THE PAST 12 MONTHS, YOU WORRIED THAT YOUR FOOD WOULD RUN OUT BEFORE YOU GOT MONEY TO BUY MORE.: NEVER TRUE

## 2024-01-31 SDOH — ECONOMIC STABILITY: FOOD INSECURITY: WITHIN THE PAST 12 MONTHS, THE FOOD YOU BOUGHT JUST DIDN'T LAST AND YOU DIDN'T HAVE MONEY TO GET MORE.: NEVER TRUE

## 2024-01-31 ASSESSMENT — LIFESTYLE VARIABLES
HOW OFTEN DO YOU HAVE A DRINK CONTAINING ALCOHOL: NEVER
HOW MANY STANDARD DRINKS CONTAINING ALCOHOL DO YOU HAVE ON A TYPICAL DAY: PATIENT DOES NOT DRINK
HOW OFTEN DO YOU HAVE SIX OR MORE DRINKS ON ONE OCCASION: NEVER
SKIP TO QUESTIONS 9-10: 1
AUDIT-C TOTAL SCORE: 0

## 2024-01-31 ASSESSMENT — PAIN SCALES - GENERAL: PAINLEVEL: 7

## 2024-01-31 ASSESSMENT — ENCOUNTER SYMPTOMS
DEPRESSION: 0
LOSS OF SENSATION IN FEET: 0
OCCASIONAL FEELINGS OF UNSTEADINESS: 1

## 2024-01-31 NOTE — PATIENT INSTRUCTIONS
Hypertension -continue lisinopril 40 mg daily and add amlodipine as directed.    She did take her blood pressure medication earlier today.   Continue to monitor your blood pressure.  Contact office with blood pressure readings greater than 150/90 or less than 90/60. Dash Diet and exercise as tolerated.  She is not orthostatic.   Her fasting POC blood sugar is 92.  Labs to be obtained.  She will be notified of results as they become available.   Follow up in the next few weeks to assess blood pressure or sooner as needed.     Fall right hip and knee pain.  Mechanical fall per patient.  X-ray right hip and knee to be obtained.  She will be notified of results as they become available.   She was referred to Ortho for further evaluation.

## 2024-01-31 NOTE — PROGRESS NOTES
Subjective   Patient ID: Deanne Gerbracht is a 63 y.o. female who presents for Follow-up (FUV- hypertension) and Hypertension.  HPI 63-year-old female with past medical history of hypertension, hyperlipidemia, GERD, episodic lightheadedness, lumbosacral spondylosis without myelopathy, carpal tunnel syndrome presents today for follow-up on blood pressure and to discuss episodes of fall in the last 2 weeks injuring right hip and right lateral knee.  Mechanical fall on ice x 2 injuring same.  Patient denies dizziness or lightheadedness loss of consciousness.  Patient does say she is tired but is not depressed.    Intermittent bouts of light headedness.   Patient states she thinks she may be dehydrated at times.     She did not eat today.  She did have a cup of coffee.       Drinks about 2 bottles of water a day - encouraged more.     Smokes 5 cigarettes a day   Smoked since age 16 about 0.5 ppd  Declines smoking cessation aids or program.    Fell 1/10/2024 on ice and 1/16/2024 Mechanical fall on ice injuring right lateral knee and buttocks.   No light headedness or dizziness.  No LOC.  Presently right lateral knee pain with residual old bruising noted from hip to knee.    Pain 3/10   Taking ibuprofen for pain - 2 capsules tid   No alcohol use.    1/6/2024 right knee x-ray - OA  Review of Systems  Review of systems: Present-fatigue. Not present-chills, fatigue and fever.  Skin: Residual bruising to the right leg and hip   HEENT: Not present-headache, ear pain, seasonal allergies, nasal congestion, and sore throat.  Neck: Not present-neck pain, neck stiffness and swollen glands.  Respiratory: Not present-difficulty breathing, cough, bloody sputum.  Cardiovascular: Hypertension. Not present- chest pain, edema, palpitations, dyspnea on exertion, shortness of breath.  Gastrointestinal: Not present-abdominal pain, bloody or very black stools, changes in bowel habits, heartburn, incontinence of stool, nausea and  "vomiting.  Genitourinary: Not present-change in bladder habits, hematuria, flank pain and dysuria.  Musculoskeletal: See HPI.   Neurological: Not present-dizziness, headache, numbness or tingling.  Psychiatric: Not present- suicidal ideation, suicidal planning, thoughts of hurting others and thoughts of self-harm.  Endocrine: Not present-appetite changes, polydipsia, polyuria, and thyroid problems.  Hematology:  See HPI.    Objective   BP (!) 166/91   Pulse 72   Temp 36.7 °C (98 °F) (Temporal)   Resp 16   Ht 1.702 m (5' 7\")   Wt 65.5 kg (144 lb 6.4 oz)   SpO2 98%   BMI 22.62 kg/m²    Took BP medication at 9 am  Repeat BP manually right arm - 158/84    Physical Exam  Gen.: Mental status-alert. Gen. appearance-cooperative, well groomed and consistent with stated age. Not in acute distress or sickly. Orientation-oriented to time, place, purpose and person. Build and nutrition-well-nourished and well-developed. Hydration-well-hydrated.    Integumentary: Color-normal coloration skin. Skin moisture-normal skin moisture.    Head and neck: Head-normocephalic, atraumatic with no lesions or palpable masses. Face-atraumatic. Neck-full range of motion and subtle. No lymphadenopathy and no nuchal rigidity.     Chest and lung exam: Chest and lung exam reveals-normal excursion with symmetric chest walls, quiet, even and easy respiratory effort with no use of accessory muscles.  Auscultation-normal breath sounds, no adventitious lung sounds and normal vocal resonance. Chest wall is normal in shape and non-tender.    Cardiovascular: Auscultation: Regular rate and rhythm. Heart sounds-normal heart sounds, S1-S2. No murmurs or gallops appreciated. Carotid arteries normal and without bruit.    Abdomen: Non-tender, no rigidity, and no palpable masses. There is no hepatosplenomegaly. Auscultation-auscultation of the abdomen reveals normal bowel sounds throughout.     Peripheral vascular: Lower extremity-inspection is normal " bilaterally. Normal temperature and no edema bilaterally.    Neurologic: Mental ubrggw-ihvzle-chjhuvpcglv. Cranial nerves: Cranial nerves II through XII grossly intact.     Musculoskeletal: Examination of the spine with no step-offs or point tenderness.  Full range of motion of the spine without pain or difficulty.   Right lower extremity-normal strength and tone, normal range of motion without pain.   Left lower extremity-normal strength and tone, normal range of motion without pain.  Bruising right anterior hip at iliac crest.     Resolving bruising with downward pooling anterior thigh to knee.   Point tenderness right anterolateral knee with IT band irritation.  No effusion.   Mild grinding right knee.  Negative anterior/posterior drawer test.    Assessment/Plan   Diagnoses and all orders for this visit:  Fall, initial encounter  -     XR hip right with pelvis when performed 2 or 3 views; Future  -     Referral to Orthopaedic Surgery; Future  Right hip pain  -     XR hip right with pelvis when performed 2 or 3 views; Future  -     Referral to Orthopaedic Surgery; Future  Pain in lateral portion of right knee  -     XR knee right 3 views; Future  -     Referral to Orthopaedic Surgery; Future  Benign hypertension  -     amLODIPine (Norvasc) 5 mg tablet; Take 1 tablet (5 mg) by mouth once daily.  -     CBC; Future  -     Comprehensive Metabolic Panel; Future  -     Follow Up In Primary Care - Established; Future  Light headedness  -     POCT Fingerstick Glucose manually resulted

## 2024-02-02 ENCOUNTER — HOSPITAL ENCOUNTER (OUTPATIENT)
Dept: RADIOLOGY | Facility: CLINIC | Age: 64
Discharge: HOME | End: 2024-02-02
Payer: COMMERCIAL

## 2024-02-02 ENCOUNTER — LAB (OUTPATIENT)
Dept: LAB | Facility: LAB | Age: 64
End: 2024-02-02
Payer: COMMERCIAL
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
## 2024-02-02 DIAGNOSIS — M25.561 PAIN IN LATERAL PORTION OF RIGHT KNEE: ICD-10-CM

## 2024-02-02 DIAGNOSIS — I10 BENIGN HYPERTENSION: ICD-10-CM

## 2024-02-02 DIAGNOSIS — M25.551 RIGHT HIP PAIN: ICD-10-CM

## 2024-02-02 LAB
ALBUMIN SERPL BCP-MCNC: 3.9 G/DL (ref 3.4–5)
ALP SERPL-CCNC: 100 U/L (ref 33–136)
ALT SERPL W P-5'-P-CCNC: 14 U/L (ref 7–45)
ANION GAP SERPL CALC-SCNC: 11 MMOL/L (ref 10–20)
AST SERPL W P-5'-P-CCNC: 14 U/L (ref 9–39)
BILIRUB SERPL-MCNC: 1.1 MG/DL (ref 0–1.2)
BUN SERPL-MCNC: 12 MG/DL (ref 6–23)
CALCIUM SERPL-MCNC: 9.3 MG/DL (ref 8.6–10.3)
CHLORIDE SERPL-SCNC: 107 MMOL/L (ref 98–107)
CO2 SERPL-SCNC: 28 MMOL/L (ref 21–32)
CREAT SERPL-MCNC: 0.67 MG/DL (ref 0.5–1.05)
EGFRCR SERPLBLD CKD-EPI 2021: >90 ML/MIN/1.73M*2
ERYTHROCYTE [DISTWIDTH] IN BLOOD BY AUTOMATED COUNT: 12.8 % (ref 11.5–14.5)
GLUCOSE SERPL-MCNC: 91 MG/DL (ref 74–99)
HCT VFR BLD AUTO: 41.3 % (ref 36–46)
HGB BLD-MCNC: 13.7 G/DL (ref 12–16)
MCH RBC QN AUTO: 33.1 PG (ref 26–34)
MCHC RBC AUTO-ENTMCNC: 33.2 G/DL (ref 32–36)
MCV RBC AUTO: 100 FL (ref 80–100)
NRBC BLD-RTO: 0 /100 WBCS (ref 0–0)
PLATELET # BLD AUTO: 255 X10*3/UL (ref 150–450)
POTASSIUM SERPL-SCNC: 4.6 MMOL/L (ref 3.5–5.3)
PROT SERPL-MCNC: 6.6 G/DL (ref 6.4–8.2)
RBC # BLD AUTO: 4.14 X10*6/UL (ref 4–5.2)
SODIUM SERPL-SCNC: 141 MMOL/L (ref 136–145)
WBC # BLD AUTO: 7.2 X10*3/UL (ref 4.4–11.3)

## 2024-02-02 PROCEDURE — 73564 X-RAY EXAM KNEE 4 OR MORE: CPT | Mod: RIGHT SIDE | Performed by: RADIOLOGY

## 2024-02-02 PROCEDURE — 73502 X-RAY EXAM HIP UNI 2-3 VIEWS: CPT | Mod: RIGHT SIDE | Performed by: RADIOLOGY

## 2024-02-02 PROCEDURE — 73564 X-RAY EXAM KNEE 4 OR MORE: CPT | Mod: RT

## 2024-02-02 PROCEDURE — 80053 COMPREHEN METABOLIC PANEL: CPT

## 2024-02-02 PROCEDURE — 85027 COMPLETE CBC AUTOMATED: CPT

## 2024-02-02 PROCEDURE — 73502 X-RAY EXAM HIP UNI 2-3 VIEWS: CPT | Mod: RT

## 2024-02-02 PROCEDURE — 36415 COLL VENOUS BLD VENIPUNCTURE: CPT

## 2024-02-29 ENCOUNTER — APPOINTMENT (OUTPATIENT)
Dept: PRIMARY CARE | Facility: CLINIC | Age: 64
End: 2024-02-29
Payer: COMMERCIAL

## 2024-03-04 PROBLEM — R04.2 HEMOPTYSIS: Status: ACTIVE | Noted: 2024-03-04

## 2024-03-04 PROBLEM — M54.50 ACUTE LOW BACK PAIN: Status: ACTIVE | Noted: 2024-03-04

## 2024-03-04 PROBLEM — S86.911A KNEE STRAIN, RIGHT, INITIAL ENCOUNTER: Status: ACTIVE | Noted: 2024-02-10

## 2024-03-04 PROBLEM — J01.90 ACUTE SINUSITIS: Status: ACTIVE | Noted: 2024-03-04

## 2024-03-04 PROBLEM — R42 DIZZINESS AND GIDDINESS: Status: ACTIVE | Noted: 2024-03-04

## 2024-03-04 PROBLEM — I49.8 SINUS ARRHYTHMIA SEEN ON ELECTROCARDIOGRAPHY: Status: ACTIVE | Noted: 2024-03-04

## 2024-03-04 PROBLEM — J18.9 PNEUMONIA: Status: ACTIVE | Noted: 2024-03-04

## 2024-03-04 PROBLEM — D72.829 LEUKOCYTOSIS: Status: ACTIVE | Noted: 2024-03-04

## 2024-03-04 PROBLEM — S76.011A HIP STRAIN, RIGHT, INITIAL ENCOUNTER: Status: ACTIVE | Noted: 2024-02-10
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
## 2024-03-04 PROBLEM — M25.569 KNEE PAIN: Status: ACTIVE | Noted: 2024-03-04

## 2024-03-04 PROBLEM — M25.551 PAIN OF RIGHT HIP JOINT: Status: ACTIVE | Noted: 2024-03-04

## 2024-03-04 PROBLEM — R09.81 CONGESTION OF PARANASAL SINUS: Status: ACTIVE | Noted: 2024-03-04

## 2024-03-07 ENCOUNTER — OFFICE VISIT (OUTPATIENT)
Dept: CARDIOLOGY | Facility: CLINIC | Age: 64
End: 2024-03-07
Payer: COMMERCIAL

## 2024-03-07 ENCOUNTER — ANCILLARY PROCEDURE (OUTPATIENT)
Dept: CARDIOLOGY | Facility: CLINIC | Age: 64
End: 2024-03-07
Payer: COMMERCIAL

## 2024-03-07 VITALS
WEIGHT: 139 LBS | HEART RATE: 77 BPM | OXYGEN SATURATION: 93 % | RESPIRATION RATE: 16 BRPM | SYSTOLIC BLOOD PRESSURE: 142 MMHG | BODY MASS INDEX: 21.77 KG/M2 | DIASTOLIC BLOOD PRESSURE: 88 MMHG

## 2024-03-07 DIAGNOSIS — R00.2 HEART PALPITATIONS: ICD-10-CM

## 2024-03-07 DIAGNOSIS — I10 ESSENTIAL HYPERTENSION: ICD-10-CM

## 2024-03-07 DIAGNOSIS — E78.5 HYPERLIPIDEMIA, UNSPECIFIED HYPERLIPIDEMIA TYPE: ICD-10-CM
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
## 2024-03-07 DIAGNOSIS — I49.8 SINUS ARRHYTHMIA SEEN ON ELECTROCARDIOGRAM: ICD-10-CM

## 2024-03-07 DIAGNOSIS — R55 SYNCOPE AND COLLAPSE: ICD-10-CM

## 2024-03-07 DIAGNOSIS — R07.9 CHEST PAIN, UNSPECIFIED TYPE: ICD-10-CM

## 2024-03-07 DIAGNOSIS — R23.1 LIVEDO RETICULARIS: ICD-10-CM

## 2024-03-07 DIAGNOSIS — I10 PRIMARY HYPERTENSION: ICD-10-CM

## 2024-03-07 DIAGNOSIS — R07.9 CHEST PAIN, UNSPECIFIED TYPE: Primary | ICD-10-CM

## 2024-03-07 PROBLEM — I16.0 HYPERTENSIVE URGENCY: Status: RESOLVED | Noted: 2019-04-18 | Resolved: 2024-03-07

## 2024-03-07 PROCEDURE — 93248 EXT ECG>7D<15D REV&INTERPJ: CPT | Performed by: INTERNAL MEDICINE

## 2024-03-07 PROCEDURE — 3077F SYST BP >= 140 MM HG: CPT | Performed by: STUDENT IN AN ORGANIZED HEALTH CARE EDUCATION/TRAINING PROGRAM

## 2024-03-07 PROCEDURE — 3079F DIAST BP 80-89 MM HG: CPT | Performed by: STUDENT IN AN ORGANIZED HEALTH CARE EDUCATION/TRAINING PROGRAM

## 2024-03-07 PROCEDURE — 99204 OFFICE O/P NEW MOD 45 MIN: CPT | Performed by: STUDENT IN AN ORGANIZED HEALTH CARE EDUCATION/TRAINING PROGRAM

## 2024-03-07 RX ORDER — REGADENOSON 0.08 MG/ML
0.4 INJECTION, SOLUTION INTRAVENOUS
Status: CANCELLED | OUTPATIENT
Start: 2024-03-07

## 2024-03-07 NOTE — PROGRESS NOTES
Cardiology New Patient History and Physical    Reason for referral: chest pains    HPI: Deanne Gerbracht is a 63 y.o.  female who presents today for chest pains. Past medical history of HTN, DLD, GERD, hx falls, tobacco dependency.      Patient was referred to cardiology clinic for chest pains.  Beth presented to cardiology clinic on 3/7/2024.  Per patient she fell on Halloween; unclear cause- patient unclear on details; fell and broke nose. Patient noted intermittent chest pains that are sub-sternal; last 2-3 minutes; occasional accompanied by palpitations.  Cardiac risk factors include HTN, DLD, tobacco dependency, and family history of ASHD (father- CABG 60s). Patient smokes ~ 1/2 PPD, trying to quit.     Past Medical History:   She has a past medical history of Carpal tunnel syndrome of right wrist, Hypertension, and Pneumonia.    Surgical History:   She has a past surgical history that includes Carpal tunnel release (Right).    Family History:   Family History   Problem Relation Name Age of Onset    Arthritis Mother      Heart disease Father       Father had ASHD (60s- CABG)    Allergies:  Cefaclor, Cefuroxime, and Shellfish derived     Social History:   - Tobacco dependency (down to 1/2 PPD; trying to quit); no alcohol; no illicit drug use  - Employed:  at [employer]  - 1 child (son)     Prior Cardiovascular Testing (personally reviewed):     ECG (12/8/2023)- sinus rhythm with sinus arrhythmia     Review of Systems:  Review of Systems   Constitutional: Negative.   HENT: Negative.     Eyes: Negative.    Cardiovascular:  Positive for chest pain and palpitations. Negative for dyspnea on exertion, near-syncope, orthopnea, paroxysmal nocturnal dyspnea and syncope.   Respiratory: Negative.     Endocrine: Negative.    Hematologic/Lymphatic: Negative.    Skin: Negative.    Musculoskeletal: Negative.    Gastrointestinal: Negative.    Genitourinary: Negative.    Neurological: Negative.     Psychiatric/Behavioral: Negative.     Allergic/Immunologic: Negative.        Objective     Outpatient Medications:    Current Outpatient Medications:     amLODIPine (Norvasc) 5 mg tablet, Take 1 tablet (5 mg) by mouth once daily., Disp: 30 tablet, Rfl: 5    atorvastatin (Lipitor) 10 mg tablet, Take 1 tablet (10 mg) by mouth once daily., Disp: 30 tablet, Rfl: 5    ibuprofen (Advil Migraine) capsule, , Disp: , Rfl:     meclizine (Antivert) 12.5 mg tablet, Take 1 tablet (12.5 mg) by mouth 3 times a day as needed for dizziness., Disp: 30 tablet, Rfl: 3    lisinopril 40 mg tablet, Take 1 tablet (40 mg) by mouth once daily., Disp: 30 tablet, Rfl: 0     Last Recorded Vitals  /88 (BP Location: Right arm, Patient Position: Sitting)   Pulse 77   Resp 16   Wt 63 kg (139 lb)   SpO2 93%   BMI 21.77 kg/m²     Physical Exam:  Physical Exam  Constitutional:       General: She is not in acute distress.  HENT:      Head: Normocephalic.      Mouth/Throat:      Mouth: Mucous membranes are moist.   Eyes:      Extraocular Movements: Extraocular movements intact.      Conjunctiva/sclera: Conjunctivae normal.   Neck:      Vascular: No carotid bruit or JVD.   Cardiovascular:      Rate and Rhythm: Normal rate and regular rhythm.      Pulses: Normal pulses.      Heart sounds: No murmur heard.  Pulmonary:      Effort: Pulmonary effort is normal. No respiratory distress.      Breath sounds: Normal breath sounds.   Abdominal:      General: Bowel sounds are normal. There is no distension.      Palpations: Abdomen is soft.   Musculoskeletal:         General: No swelling.   Skin:     General: Skin is warm and dry.      Comments: Livedo reticularis on right thigh   Neurological:      General: No focal deficit present.      Mental Status: She is alert.      Cranial Nerves: No cranial nerve deficit.      Motor: No weakness.   Psychiatric:         Mood and Affect: Mood normal.         Behavior: Behavior normal.         Lab Review:    Lab  Results   Component Value Date    GLUCOSE 91 02/02/2024    CALCIUM 9.3 02/02/2024     02/02/2024    K 4.6 02/02/2024    CO2 28 02/02/2024     02/02/2024    BUN 12 02/02/2024    CREATININE 0.67 02/02/2024       Lab Results   Component Value Date    WBC 7.2 02/02/2024    HGB 13.7 02/02/2024    HCT 41.3 02/02/2024     02/02/2024     02/02/2024       Lab Results   Component Value Date    CHOL 237 (H) 12/12/2023     Lab Results   Component Value Date    HDL 57.4 12/12/2023     Lab Results   Component Value Date    LDLCALC 144 (H) 12/12/2023     Lab Results   Component Value Date    TRIG 177 (H) 12/12/2023       Lab Results   Component Value Date    TSH 3.83 12/12/2023       Assessment:   63 y.o.  female who presents today for chest pains. Past medical history of HTN, DLD, GERD, hx falls, tobacco dependency.      Given intermittent chest pains and palpitations; cardiac risk factors as below >  recommend further evaluation with pharmacologic nuclear medicine stress test (patient unable [place] and treadmill) and transthoracic echocardiogram. Patient's ardiac risk factors include HTN, DLD, tobacco dependency, and family history of ASHD (father- CABG 60s).    Patient also noted to have livedo reticularis on right thigh. This can be seen in certain conditions such as reynaud's disease and other blood cotting disorders. If livedo reticularis does not improve would then recommend evaluation in vascular medicine clinic.     Overall Plan:  1.  Chest pain with multiple cardiac risk factors as above  - Recommend further evaluation with pharmacologic nuclear medicine stress as patient unable to exercise vigorously on a treadmill  - Check complete transthoracic echocardiogram    2.  Livedo reticularis  - If symptoms do not resolve Then recommend further evaluation vascular medicine clinic as above    3.  Tobacco dependency  - Strongly recommended tobacco cessation  - I personally counseled the  patient for 3 minutes on tobacco cessation; patient not ready to quit    4.  Dyslipidemia  - Continue atorvastatin  - Continue diet and lifestyle modifications    5.  Hypertension (goal BP < 130/90 mmHg)   - Continue lisinopril, amlodipine    Disposition: Return to cardiology clinic after above testing     Ascencion Sutton MD

## 2024-03-07 NOTE — PATIENT INSTRUCTIONS
We will get a non-exercise stress test (pharmacologic nuclear medicine stress test) and heart ultrasound for further evaluation of your chest pains    For your heart palpitations we will further evaluate with a heart monitor    For your skin redness on your right thigh your were found to have Livedo reticularis (LR). It refers to a netlike pattern of reddish-blue skin discoloration. This can be seen in certain conditions such as raynaud's disease and sometimes secondary to certain blood clotting disorders. If this does not improve I would then have you seen in vascular medicine clinic.     We will see you back in heart clinic after your testing.  Please call my nurse Martha with any questions; her contact information is below.       Thank you for your visit today. Please contact our office (via [employer] or phone) with any additional questions.     [facility] [place]    Martha, RN/Clinic Nurse for:    Dr. Lesley Pak    [address], [address]  [place] [address]    Phone: [phone] Press Option 5 then Option 3 to speak with the Clinic Nurse (Martha)    _____    To Reach:    Billing Questions -    [phone]  Scheduling / Rescheduling -  Option 1  Refills / Medication Requests -  Option 3  General Office / [place] -  Option 4  Results -     Option 6  Medical Records -    Option 7  Repeat Options -    Option 9

## 2024-03-24 ASSESSMENT — ENCOUNTER SYMPTOMS
EYES NEGATIVE: 1
NEUROLOGICAL NEGATIVE: 1
DYSPNEA ON EXERTION: 0
HEMATOLOGIC/LYMPHATIC NEGATIVE: 1
NEAR-SYNCOPE: 0
MUSCULOSKELETAL NEGATIVE: 1
GASTROINTESTINAL NEGATIVE: 1
ALLERGIC/IMMUNOLOGIC NEGATIVE: 1
CONSTITUTIONAL NEGATIVE: 1
PND: 0
PALPITATIONS: 1
ORTHOPNEA: 0
ENDOCRINE NEGATIVE: 1
SYNCOPE: 0
PSYCHIATRIC NEGATIVE: 1
RESPIRATORY NEGATIVE: 1

## 2024-03-28 ENCOUNTER — HOSPITAL ENCOUNTER (OUTPATIENT)
Dept: RADIOLOGY | Facility: CLINIC | Age: 64
Discharge: HOME | End: 2024-03-28
Payer: COMMERCIAL

## 2024-03-28 ENCOUNTER — HOSPITAL ENCOUNTER (OUTPATIENT)
Dept: CARDIOLOGY | Facility: CLINIC | Age: 64
Discharge: HOME | End: 2024-03-28
Payer: COMMERCIAL

## 2024-03-28 VITALS
WEIGHT: 139 LBS | SYSTOLIC BLOOD PRESSURE: 142 MMHG | HEIGHT: 67 IN | BODY MASS INDEX: 21.82 KG/M2 | DIASTOLIC BLOOD PRESSURE: 88 MMHG

## 2024-03-28 DIAGNOSIS — R55 SYNCOPE AND COLLAPSE: ICD-10-CM

## 2024-03-28 DIAGNOSIS — R07.89 OTHER CHEST PAIN: ICD-10-CM

## 2024-03-28 DIAGNOSIS — R07.9 CHEST PAIN, UNSPECIFIED TYPE: ICD-10-CM

## 2024-03-28 PROCEDURE — 78452 HT MUSCLE IMAGE SPECT MULT: CPT | Performed by: INTERNAL MEDICINE

## 2024-03-28 PROCEDURE — 93306 TTE W/DOPPLER COMPLETE: CPT | Performed by: STUDENT IN AN ORGANIZED HEALTH CARE EDUCATION/TRAINING PROGRAM

## 2024-03-28 PROCEDURE — 3430000001 HC RX 343 DIAGNOSTIC RADIOPHARMACEUTICALS: Performed by: STUDENT IN AN ORGANIZED HEALTH CARE EDUCATION/TRAINING PROGRAM

## 2024-03-28 PROCEDURE — 2500000004 HC RX 250 GENERAL PHARMACY W/ HCPCS (ALT 636 FOR OP/ED): Performed by: STUDENT IN AN ORGANIZED HEALTH CARE EDUCATION/TRAINING PROGRAM

## 2024-03-28 PROCEDURE — 93306 TTE W/DOPPLER COMPLETE: CPT

## 2024-03-28 PROCEDURE — A9502 TC99M TETROFOSMIN: HCPCS | Performed by: STUDENT IN AN ORGANIZED HEALTH CARE EDUCATION/TRAINING PROGRAM

## 2024-03-28 PROCEDURE — 93017 CV STRESS TEST TRACING ONLY: CPT

## 2024-03-28 PROCEDURE — 78452 HT MUSCLE IMAGE SPECT MULT: CPT

## 2024-03-28 RX ORDER — REGADENOSON 0.08 MG/ML
0.4 INJECTION, SOLUTION INTRAVENOUS
Status: COMPLETED | OUTPATIENT
Start: 2024-03-28 | End: 2024-03-28

## 2024-03-28 RX ADMIN — REGADENOSON 0.4 MG: 0.08 INJECTION, SOLUTION INTRAVENOUS at 09:25

## 2024-03-28 RX ADMIN — TETROFOSMIN 12 MILLICURIE: 0.23 INJECTION, POWDER, LYOPHILIZED, FOR SOLUTION INTRAVENOUS at 13:49

## 2024-03-28 RX ADMIN — TETROFOSMIN 34.3 MILLICURIE: 0.23 INJECTION, POWDER, LYOPHILIZED, FOR SOLUTION INTRAVENOUS at 09:35

## 2024-04-01 ENCOUNTER — TELEPHONE (OUTPATIENT)
Dept: CARDIOLOGY | Facility: CLINIC | Age: 64
End: 2024-04-01
Payer: COMMERCIAL

## 2024-04-01 LAB
AORTIC VALVE MEAN GRADIENT: 7.5 MMHG
AORTIC VALVE PEAK VELOCITY: 1.88 M/S
AV PEAK GRADIENT: 14.1 MMHG
AVA (PEAK VEL): 1.53 CM2
AVA (VTI): 1.45 CM2
EJECTION FRACTION APICAL 4 CHAMBER: 55.9
LEFT ATRIUM VOLUME AREA LENGTH INDEX BSA: 25.2 ML/M2
LEFT VENTRICLE INTERNAL DIMENSION DIASTOLE: 4.04 CM (ref 3.5–6)
LEFT VENTRICULAR OUTFLOW TRACT DIAMETER: 1.86 CM
LV EJECTION FRACTION BIPLANE: 65 %
MITRAL VALVE E/A RATIO: 0.81
RIGHT VENTRICLE FREE WALL PEAK S': 1.3 CM/S
RIGHT VENTRICLE PEAK SYSTOLIC PRESSURE: 41 MMHG
TRICUSPID ANNULAR PLANE SYSTOLIC EXCURSION: 2.8 CM

## 2024-04-01 NOTE — TELEPHONE ENCOUNTER
----- Message from Ascencion Sutton MD sent at 4/1/2024  9:03 AM EDT -----  Please let patient know that her stress test was normal.     Ascencion Sutton MD    ----- Message -----  From: Interface, Radiology Results In  Sent: 3/29/2024   9:25 AM EDT  To: Ascencion Sutton MD

## 2024-04-02 LAB — BODY SURFACE AREA: 1.73 M2

## 2024-04-03 ENCOUNTER — APPOINTMENT (OUTPATIENT)
Dept: CARDIOLOGY | Facility: CLINIC | Age: 64
End: 2024-04-03
Payer: COMMERCIAL

## 2024-04-10 ENCOUNTER — OFFICE VISIT (OUTPATIENT)
Dept: CARDIOLOGY | Facility: CLINIC | Age: 64
End: 2024-04-10
Payer: COMMERCIAL

## 2024-04-10 VITALS
DIASTOLIC BLOOD PRESSURE: 78 MMHG | OXYGEN SATURATION: 96 % | BODY MASS INDEX: 21.97 KG/M2 | SYSTOLIC BLOOD PRESSURE: 142 MMHG | WEIGHT: 140 LBS | HEART RATE: 63 BPM | HEIGHT: 67 IN

## 2024-04-10 DIAGNOSIS — R00.2 HEART PALPITATIONS: ICD-10-CM

## 2024-04-10 DIAGNOSIS — E78.5 HYPERLIPIDEMIA, UNSPECIFIED HYPERLIPIDEMIA TYPE: ICD-10-CM

## 2024-04-10 DIAGNOSIS — I49.8 SINUS ARRHYTHMIA SEEN ON ELECTROCARDIOGRAM: ICD-10-CM

## 2024-04-10 DIAGNOSIS — R55 SYNCOPE AND COLLAPSE: ICD-10-CM

## 2024-04-10 DIAGNOSIS — I10 PRIMARY HYPERTENSION: ICD-10-CM

## 2024-04-10 PROBLEM — R23.1 LIVEDO RETICULARIS: Status: ACTIVE | Noted: 2024-04-10

## 2024-04-10 PROCEDURE — 99213 OFFICE O/P EST LOW 20 MIN: CPT | Performed by: STUDENT IN AN ORGANIZED HEALTH CARE EDUCATION/TRAINING PROGRAM

## 2024-04-10 PROCEDURE — 3078F DIAST BP <80 MM HG: CPT | Performed by: STUDENT IN AN ORGANIZED HEALTH CARE EDUCATION/TRAINING PROGRAM

## 2024-04-10 PROCEDURE — 3077F SYST BP >= 140 MM HG: CPT | Performed by: STUDENT IN AN ORGANIZED HEALTH CARE EDUCATION/TRAINING PROGRAM

## 2024-04-10 RX ORDER — LISINOPRIL 40 MG/1
40 TABLET ORAL DAILY
Qty: 90 TABLET | Refills: 3 | Status: SHIPPED | OUTPATIENT
Start: 2024-04-10 | End: 2025-04-10

## 2024-04-10 RX ORDER — LISINOPRIL 40 MG/1
40 TABLET ORAL DAILY
Qty: 30 TABLET | Refills: 0 | Status: SHIPPED | OUTPATIENT
Start: 2024-04-10 | End: 2024-04-10

## 2024-04-10 ASSESSMENT — ENCOUNTER SYMPTOMS
SYNCOPE: 0
RESPIRATORY NEGATIVE: 1
EYES NEGATIVE: 1
DYSPNEA ON EXERTION: 0
GASTROINTESTINAL NEGATIVE: 1
NEAR-SYNCOPE: 0
MUSCULOSKELETAL NEGATIVE: 1
ALLERGIC/IMMUNOLOGIC NEGATIVE: 1
ENDOCRINE NEGATIVE: 1
HEMATOLOGIC/LYMPHATIC NEGATIVE: 1
PALPITATIONS: 0
PND: 0
NEUROLOGICAL NEGATIVE: 1
PSYCHIATRIC NEGATIVE: 1
ORTHOPNEA: 0
CONSTITUTIONAL NEGATIVE: 1

## 2024-04-10 NOTE — PROGRESS NOTES
Cardiology Established Outpatient visit    Reason for referral: follow-up for chest pains    HPI: Deanne Gerbracht is a 63 y.o.  female who presents today for chest pains. Past medical history of HTN, DLD, GERD, hx falls, tobacco dependency.      Patient was referred to cardiology clinic for chest pains.  Beth presented to cardiology clinic on 3/7/2024.  Per patient she fell on Halloween; unclear cause- patient unclear on details; fell and broke nose. Patient noted intermittent chest pains that are sub-sternal; last 2-3 minutes; occasional accompanied by palpitations.  Cardiac risk factors include HTN, DLD, tobacco dependency, and family history of ASHD (father- CABG 60s). Patient smokes ~ 1/2 PPD, trying to quit. Given intermittent chest pains and palpitations; cardiac risk factors  >  recommend further evaluation with pharmacologic nuclear medicine stress test (patient unable to exercise on treadmill) and transthoracic echocardiogram.     Beth return to cardiology clinic on 4/10/2024.  Patient notes no further episodes of chest pain.  No recent palpitations.  Holter monitor showed brief runs of atrial tachycardia.  Nuclear medicine stress test was low risk for ischemia.  Transthoracic echocardiogram showed normal LV systolic function; LVH.     Past Medical History:   She has a past medical history of Carpal tunnel syndrome of right wrist, Hypertension, Hypertensive urgency (04/18/2019), and Pneumonia.    Surgical History:   She has a past surgical history that includes Carpal tunnel release (Right).    Family History:   Family History   Problem Relation Name Age of Onset    Arthritis Mother      Heart disease Father       Father had ASHD (60s- CABG)    Allergies:  Cefaclor, Cefuroxime, and Shellfish derived     Social History:   - Tobacco dependency (down to 1/2 PPD; trying to quit); no alcohol; no illicit drug use  - Employed:  at [employer]  - 1 child (son)     Prior Cardiovascular Testing  (personally reviewed):     NM stress testing (3/28/2024)  1. Normal stress myocardial perfusion imaging in response to  pharmacologic stress. No ischemic ECG changes  2. Well-maintained left ventricular function.  50%    TTE (3/24/2024)   1. Left ventricular systolic function is normal.   2. There is moderate to severe concentric left ventricular hypertrophy.   3. Spectral Doppler shows an impaired relaxation pattern of left ventricular diastolic filling.   4. Mildly elevated RVSP.    Holter Monitor (3/7/2024)  The predominant rhythm during Holter recording was sinus rhythm with a minimum heart rate of 47 bpm, average heart rate of 65 bpm; no episodes of atrial fibrillation; brief episodes of atrial tachycardia; no episodes of high-grade AV block; no VT.    ECG (12/8/2023)- sinus rhythm with sinus arrhythmia     Review of Systems:  Review of Systems   Constitutional: Negative.   HENT: Negative.     Eyes: Negative.    Cardiovascular:  Negative for chest pain, dyspnea on exertion, near-syncope, orthopnea, palpitations, paroxysmal nocturnal dyspnea and syncope.   Respiratory: Negative.     Endocrine: Negative.    Hematologic/Lymphatic: Negative.    Skin: Negative.    Musculoskeletal: Negative.    Gastrointestinal: Negative.    Genitourinary: Negative.    Neurological: Negative.    Psychiatric/Behavioral: Negative.     Allergic/Immunologic: Negative.        Objective     Outpatient Medications:    Current Outpatient Medications:     amLODIPine (Norvasc) 5 mg tablet, Take 1 tablet (5 mg) by mouth once daily., Disp: 30 tablet, Rfl: 5    atorvastatin (Lipitor) 10 mg tablet, Take 1 tablet (10 mg) by mouth once daily., Disp: 30 tablet, Rfl: 5    ibuprofen (Advil Migraine) capsule, , Disp: , Rfl:     meclizine (Antivert) 12.5 mg tablet, Take 1 tablet (12.5 mg) by mouth 3 times a day as needed for dizziness., Disp: 30 tablet, Rfl: 3    lisinopril 40 mg tablet, Take 1 tablet (40 mg) by mouth once daily., Disp: 30 tablet, Rfl: 0  "    Last Recorded Vitals  /78 (BP Location: Left arm, Patient Position: Sitting, BP Cuff Size: Adult)   Pulse 63   Ht 1.702 m (5' 7\")   Wt 63.5 kg (140 lb)   SpO2 96%   BMI 21.93 kg/m²     Physical Exam:  Physical Exam  Constitutional:       General: She is not in acute distress.  HENT:      Head: Normocephalic.      Mouth/Throat:      Mouth: Mucous membranes are moist.   Eyes:      Extraocular Movements: Extraocular movements intact.      Conjunctiva/sclera: Conjunctivae normal.   Neck:      Vascular: No carotid bruit or JVD.   Cardiovascular:      Rate and Rhythm: Normal rate and regular rhythm.      Pulses: Normal pulses.      Heart sounds: No murmur heard.  Pulmonary:      Effort: Pulmonary effort is normal. No respiratory distress.      Breath sounds: Normal breath sounds.   Abdominal:      General: Bowel sounds are normal. There is no distension.      Palpations: Abdomen is soft.   Musculoskeletal:         General: No swelling.   Skin:     General: Skin is warm and dry.   Neurological:      General: No focal deficit present.      Mental Status: She is alert.      Cranial Nerves: No cranial nerve deficit.      Motor: No weakness.   Psychiatric:         Mood and Affect: Mood normal.         Behavior: Behavior normal.         Lab Review:    Lab Results   Component Value Date    GLUCOSE 91 02/02/2024    CALCIUM 9.3 02/02/2024     02/02/2024    K 4.6 02/02/2024    CO2 28 02/02/2024     02/02/2024    BUN 12 02/02/2024    CREATININE 0.67 02/02/2024       Lab Results   Component Value Date    WBC 7.2 02/02/2024    HGB 13.7 02/02/2024    HCT 41.3 02/02/2024     02/02/2024     02/02/2024       Lab Results   Component Value Date    CHOL 237 (H) 12/12/2023     Lab Results   Component Value Date    HDL 57.4 12/12/2023     Lab Results   Component Value Date    LDLCALC 144 (H) 12/12/2023     Lab Results   Component Value Date    TRIG 177 (H) 12/12/2023       Lab Results   Component Value " Date    TSH 3.83 12/12/2023       Assessment:   63 y.o.  female who presents today for a follow-up for chest pains. Past medical history of HTN, DLD, GERD, hx falls, tobacco dependency.      Given intermittent chest pains and palpitations; cardiac risk factors as below >  recommend further evaluation with pharmacologic nuclear medicine stress test (patient unable to exercise on treadmill) and transthoracic echocardiogram. Patient's cardiac risk factors include HTN, DLD, tobacco dependency, and family history of ASHD (father- CABG 60s).    Beth return to cardiology clinic on 4/10/2024.  Patient notes no further episodes of chest pain.  No recent palpitations.  Holter monitor showed brief runs of atrial tachycardia.  Nuclear medicine stress test was low risk for ischemia.  Transthoracic echocardiogram showed normal LV systolic function; LVH. Continue cardiac management as below.     Overall Plan:  1.  Chest pain (resolved)  - Pharmacologic nuclear medicine stress was low risk for ischemia  - Complete transthoracic echocardiogram showed normal LV systolic function; normal wall motion; + LVH  -Will defer additional testing at this time; if patient had recurrent chest pains would then consider coronary CTA    2.  Livedo reticularis (resolved)  - If symptoms return would then consider further evaluation vascular medicine clinic    3.  Tobacco dependency  - Strongly recommended tobacco cessation; patient is thinking about quitting but not ready to quit  - I personally counseled the patient for 3 minutes on tobacco cessation; patient not ready to quit (4/10/2024)    4.  Dyslipidemia  - Continue atorvastatin  - Continue diet and lifestyle modifications    5.  Hypertension (goal BP < 130/90 mmHg)   - Continue lisinopril, amlodipine; if not at goal would then increase amlodipine to 10 mg daily    Disposition: Return to cardiology clinic in 6-12 months    Ascencion Sutton MD

## 2024-06-25 ENCOUNTER — APPOINTMENT (OUTPATIENT)
Dept: CARDIOLOGY | Facility: HOSPITAL | Age: 64
End: 2024-06-25
Payer: COMMERCIAL

## 2024-06-25 ENCOUNTER — APPOINTMENT (OUTPATIENT)
Dept: RADIOLOGY | Facility: HOSPITAL | Age: 64
End: 2024-06-25
Payer: COMMERCIAL

## 2024-06-25 ENCOUNTER — HOSPITAL ENCOUNTER (OUTPATIENT)
Facility: HOSPITAL | Age: 64
Setting detail: OBSERVATION
LOS: 1 days | Discharge: HOME | End: 2024-06-27
Attending: INTERNAL MEDICINE | Admitting: STUDENT IN AN ORGANIZED HEALTH CARE EDUCATION/TRAINING PROGRAM
Payer: COMMERCIAL

## 2024-06-25 DIAGNOSIS — I10 PRIMARY HYPERTENSION: ICD-10-CM

## 2024-06-25 DIAGNOSIS — I63.9 ACUTE CVA (CEREBROVASCULAR ACCIDENT) (MULTI): Primary | ICD-10-CM

## 2024-06-25 DIAGNOSIS — I63.9 OCCIPITAL STROKE (MULTI): ICD-10-CM

## 2024-06-25 DIAGNOSIS — I10 HYPERTENSION, UNSPECIFIED TYPE: ICD-10-CM

## 2024-06-25 LAB
ABO GROUP (TYPE) IN BLOOD: NORMAL
ALBUMIN SERPL BCP-MCNC: 4.8 G/DL (ref 3.4–5)
ALP SERPL-CCNC: 85 U/L (ref 33–136)
ALT SERPL W P-5'-P-CCNC: 14 U/L (ref 7–45)
ANION GAP SERPL CALC-SCNC: 10 MMOL/L (ref 10–20)
ANTIBODY SCREEN: NORMAL
APPEARANCE UR: CLEAR
APTT PPP: 33 SECONDS (ref 27–38)
AST SERPL W P-5'-P-CCNC: 18 U/L (ref 9–39)
BASOPHILS # BLD MANUAL: 0 X10*3/UL (ref 0–0.1)
BASOPHILS NFR BLD MANUAL: 0 %
BILIRUB SERPL-MCNC: 0.6 MG/DL (ref 0–1.2)
BILIRUB UR STRIP.AUTO-MCNC: NEGATIVE MG/DL
BNP SERPL-MCNC: 74 PG/ML (ref 0–99)
BUN SERPL-MCNC: 14 MG/DL (ref 6–23)
CALCIUM SERPL-MCNC: 9.9 MG/DL (ref 8.6–10.3)
CARDIAC TROPONIN I PNL SERPL HS: 5 NG/L (ref 0–13)
CARDIAC TROPONIN I PNL SERPL HS: 6 NG/L (ref 0–13)
CHLORIDE SERPL-SCNC: 106 MMOL/L (ref 98–107)
CHOLEST SERPL-MCNC: 263 MG/DL (ref 0–199)
CHOLESTEROL/HDL RATIO: 5
CO2 SERPL-SCNC: 26 MMOL/L (ref 21–32)
COLOR UR: COLORLESS
CREAT SERPL-MCNC: 0.78 MG/DL (ref 0.5–1.05)
EGFRCR SERPLBLD CKD-EPI 2021: 85 ML/MIN/1.73M*2
EOSINOPHIL # BLD MANUAL: 0.21 X10*3/UL (ref 0–0.7)
EOSINOPHIL NFR BLD MANUAL: 3 %
ERYTHROCYTE [DISTWIDTH] IN BLOOD BY AUTOMATED COUNT: 13.3 % (ref 11.5–14.5)
GLUCOSE SERPL-MCNC: 85 MG/DL (ref 74–99)
GLUCOSE UR STRIP.AUTO-MCNC: NORMAL MG/DL
HCT VFR BLD AUTO: 44.6 % (ref 36–46)
HDLC SERPL-MCNC: 52.1 MG/DL
HGB BLD-MCNC: 14.7 G/DL (ref 12–16)
IMM GRANULOCYTES # BLD AUTO: 0.02 X10*3/UL (ref 0–0.7)
IMM GRANULOCYTES NFR BLD AUTO: 0.3 % (ref 0–0.9)
INR PPP: 1 (ref 0.9–1.1)
KETONES UR STRIP.AUTO-MCNC: NEGATIVE MG/DL
LACTATE SERPL-SCNC: 0.6 MMOL/L (ref 0.4–2)
LDLC SERPL CALC-MCNC: 184 MG/DL
LEUKOCYTE ESTERASE UR QL STRIP.AUTO: NEGATIVE
LYMPHOCYTES # BLD MANUAL: 2.55 X10*3/UL (ref 1.2–4.8)
LYMPHOCYTES NFR BLD MANUAL: 37 %
MAGNESIUM SERPL-MCNC: 2.09 MG/DL (ref 1.6–2.4)
MCH RBC QN AUTO: 33.1 PG (ref 26–34)
MCHC RBC AUTO-ENTMCNC: 33 G/DL (ref 32–36)
MCV RBC AUTO: 101 FL (ref 80–100)
MONOCYTES # BLD MANUAL: 0.35 X10*3/UL (ref 0.1–1)
MONOCYTES NFR BLD MANUAL: 5 %
NEUTROPHILS # BLD MANUAL: 3.66 X10*3/UL (ref 1.2–7.7)
NEUTS BAND # BLD MANUAL: 0.28 X10*3/UL (ref 0–0.7)
NEUTS BAND NFR BLD MANUAL: 4 %
NEUTS SEG # BLD MANUAL: 3.38 X10*3/UL (ref 1.2–7)
NEUTS SEG NFR BLD MANUAL: 49 %
NITRITE UR QL STRIP.AUTO: NEGATIVE
NON HDL CHOLESTEROL: 211 MG/DL (ref 0–149)
NRBC BLD-RTO: 0 /100 WBCS (ref 0–0)
PH UR STRIP.AUTO: 6.5 [PH]
PLATELET # BLD AUTO: 240 X10*3/UL (ref 150–450)
POTASSIUM SERPL-SCNC: 4 MMOL/L (ref 3.5–5.3)
PROT SERPL-MCNC: 8.2 G/DL (ref 6.4–8.2)
PROT UR STRIP.AUTO-MCNC: NEGATIVE MG/DL
PROTHROMBIN TIME: 11.1 SECONDS (ref 9.8–12.8)
RBC # BLD AUTO: 4.44 X10*6/UL (ref 4–5.2)
RBC # UR STRIP.AUTO: NEGATIVE /UL
RBC MORPH BLD: NORMAL
RH FACTOR (ANTIGEN D): NORMAL
SODIUM SERPL-SCNC: 138 MMOL/L (ref 136–145)
SP GR UR STRIP.AUTO: 1
TOTAL CELLS COUNTED BLD: 100
TRIGL SERPL-MCNC: 136 MG/DL (ref 0–149)
UROBILINOGEN UR STRIP.AUTO-MCNC: NORMAL MG/DL
VARIANT LYMPHS # BLD MANUAL: 0.14 X10*3/UL (ref 0–0.5)
VARIANT LYMPHS NFR BLD: 2 %
VLDL: 27 MG/DL (ref 0–40)
WBC # BLD AUTO: 6.9 X10*3/UL (ref 4.4–11.3)

## 2024-06-25 PROCEDURE — 99285 EMERGENCY DEPT VISIT HI MDM: CPT

## 2024-06-25 PROCEDURE — 93005 ELECTROCARDIOGRAM TRACING: CPT

## 2024-06-25 PROCEDURE — 86901 BLOOD TYPING SEROLOGIC RH(D): CPT | Performed by: INTERNAL MEDICINE

## 2024-06-25 PROCEDURE — 84484 ASSAY OF TROPONIN QUANT: CPT | Performed by: INTERNAL MEDICINE

## 2024-06-25 PROCEDURE — 81003 URINALYSIS AUTO W/O SCOPE: CPT | Performed by: INTERNAL MEDICINE

## 2024-06-25 PROCEDURE — 83735 ASSAY OF MAGNESIUM: CPT | Performed by: INTERNAL MEDICINE

## 2024-06-25 PROCEDURE — 83605 ASSAY OF LACTIC ACID: CPT | Performed by: INTERNAL MEDICINE

## 2024-06-25 PROCEDURE — 71045 X-RAY EXAM CHEST 1 VIEW: CPT

## 2024-06-25 PROCEDURE — 70450 CT HEAD/BRAIN W/O DYE: CPT

## 2024-06-25 PROCEDURE — 85027 COMPLETE CBC AUTOMATED: CPT | Performed by: INTERNAL MEDICINE

## 2024-06-25 PROCEDURE — 85007 BL SMEAR W/DIFF WBC COUNT: CPT | Performed by: INTERNAL MEDICINE

## 2024-06-25 PROCEDURE — 70450 CT HEAD/BRAIN W/O DYE: CPT | Performed by: STUDENT IN AN ORGANIZED HEALTH CARE EDUCATION/TRAINING PROGRAM

## 2024-06-25 PROCEDURE — 36415 COLL VENOUS BLD VENIPUNCTURE: CPT | Performed by: INTERNAL MEDICINE

## 2024-06-25 PROCEDURE — 83880 ASSAY OF NATRIURETIC PEPTIDE: CPT | Performed by: INTERNAL MEDICINE

## 2024-06-25 PROCEDURE — 86850 RBC ANTIBODY SCREEN: CPT | Performed by: INTERNAL MEDICINE

## 2024-06-25 PROCEDURE — 80061 LIPID PANEL: CPT

## 2024-06-25 PROCEDURE — 85730 THROMBOPLASTIN TIME PARTIAL: CPT | Performed by: INTERNAL MEDICINE

## 2024-06-25 PROCEDURE — 2500000001 HC RX 250 WO HCPCS SELF ADMINISTERED DRUGS (ALT 637 FOR MEDICARE OP): Performed by: INTERNAL MEDICINE

## 2024-06-25 PROCEDURE — 2500000001 HC RX 250 WO HCPCS SELF ADMINISTERED DRUGS (ALT 637 FOR MEDICARE OP)

## 2024-06-25 PROCEDURE — 84460 ALANINE AMINO (ALT) (SGPT): CPT | Performed by: INTERNAL MEDICINE

## 2024-06-25 PROCEDURE — 85610 PROTHROMBIN TIME: CPT | Performed by: INTERNAL MEDICINE

## 2024-06-25 RX ORDER — HYDRALAZINE HYDROCHLORIDE 20 MG/ML
5 INJECTION INTRAMUSCULAR; INTRAVENOUS
Status: DISCONTINUED | OUTPATIENT
Start: 2024-06-25 | End: 2024-06-26

## 2024-06-25 RX ORDER — NAPROXEN SODIUM 220 MG/1
324 TABLET, FILM COATED ORAL ONCE
Status: COMPLETED | OUTPATIENT
Start: 2024-06-25 | End: 2024-06-25

## 2024-06-25 RX ORDER — HYDRALAZINE HYDROCHLORIDE 20 MG/ML
10 INJECTION INTRAMUSCULAR; INTRAVENOUS
Status: CANCELLED | OUTPATIENT
Start: 2024-06-25 | End: 2024-06-27

## 2024-06-25 RX ORDER — DEXTROSE 50 % IN WATER (D50W) INTRAVENOUS SYRINGE
12.5
Status: DISCONTINUED | OUTPATIENT
Start: 2024-06-25 | End: 2024-06-27 | Stop reason: HOSPADM

## 2024-06-25 RX ORDER — ACETAMINOPHEN 325 MG/1
650 TABLET ORAL EVERY 6 HOURS PRN
Status: DISCONTINUED | OUTPATIENT
Start: 2024-06-25 | End: 2024-06-27 | Stop reason: HOSPADM

## 2024-06-25 RX ORDER — ASPIRIN 81 MG/1
81 TABLET ORAL DAILY
Status: DISCONTINUED | OUTPATIENT
Start: 2024-06-26 | End: 2024-06-27 | Stop reason: HOSPADM

## 2024-06-25 RX ORDER — HYDRALAZINE HYDROCHLORIDE 25 MG/1
25 TABLET, FILM COATED ORAL EVERY 6 HOURS PRN
Status: CANCELLED | OUTPATIENT
Start: 2024-06-27

## 2024-06-25 RX ORDER — ATORVASTATIN CALCIUM 40 MG/1
40 TABLET, FILM COATED ORAL NIGHTLY
Status: DISCONTINUED | OUTPATIENT
Start: 2024-06-25 | End: 2024-06-27 | Stop reason: HOSPADM

## 2024-06-25 RX ORDER — LABETALOL HYDROCHLORIDE 5 MG/ML
10 INJECTION, SOLUTION INTRAVENOUS EVERY 10 MIN PRN
Status: CANCELLED | OUTPATIENT
Start: 2024-06-25 | End: 2024-06-27

## 2024-06-25 RX ORDER — ENOXAPARIN SODIUM 100 MG/ML
40 INJECTION SUBCUTANEOUS EVERY 24 HOURS
Status: DISCONTINUED | OUTPATIENT
Start: 2024-06-26 | End: 2024-06-27 | Stop reason: HOSPADM

## 2024-06-25 RX ORDER — TALC
6 POWDER (GRAM) TOPICAL
Status: DISCONTINUED | OUTPATIENT
Start: 2024-06-25 | End: 2024-06-27 | Stop reason: HOSPADM

## 2024-06-25 RX ORDER — ACETAMINOPHEN 325 MG/1
650 TABLET ORAL EVERY 4 HOURS PRN
Status: DISCONTINUED | OUTPATIENT
Start: 2024-06-25 | End: 2024-06-25

## 2024-06-25 RX ORDER — DEXTROSE 50 % IN WATER (D50W) INTRAVENOUS SYRINGE
25
Status: DISCONTINUED | OUTPATIENT
Start: 2024-06-25 | End: 2024-06-27 | Stop reason: HOSPADM

## 2024-06-25 ASSESSMENT — PAIN SCALES - GENERAL
PAINLEVEL_OUTOF10: 0 - NO PAIN
PAINLEVEL_OUTOF10: 0 - NO PAIN

## 2024-06-25 ASSESSMENT — LIFESTYLE VARIABLES
EVER HAD A DRINK FIRST THING IN THE MORNING TO STEADY YOUR NERVES TO GET RID OF A HANGOVER: NO
EVER FELT BAD OR GUILTY ABOUT YOUR DRINKING: NO
TOTAL SCORE: 0
HAVE YOU EVER FELT YOU SHOULD CUT DOWN ON YOUR DRINKING: NO
HAVE PEOPLE ANNOYED YOU BY CRITICIZING YOUR DRINKING: NO

## 2024-06-25 ASSESSMENT — PAIN - FUNCTIONAL ASSESSMENT: PAIN_FUNCTIONAL_ASSESSMENT: 0-10

## 2024-06-25 NOTE — ED PROVIDER NOTES
HPI   Chief Complaint   Patient presents with    Stroke       Patient presented for evaluation of visual changes.  Patient was seen by her ophthalmologist today and noted that her right eye was turning in.  Patient was sent to the ED for evaluation.  Patient notes she is been having some asymmetry to the right side of her face.  Patient also notes blurry vision.  Patient indicates symptoms been going on for approximately 2 weeks.  Patient notes she does have difficulty walking.      History provided by:  Patient                      Karthik Coma Scale Score: 15         NIH Stroke Scale: 0             Patient History   Past Medical History:   Diagnosis Date    Carpal tunnel syndrome of right wrist     Hypertension     Hypertensive urgency 04/18/2019    Pneumonia      Past Surgical History:   Procedure Laterality Date    CARPAL TUNNEL RELEASE Right      Family History   Problem Relation Name Age of Onset    Arthritis Mother      Heart disease Father       Social History     Tobacco Use    Smoking status: Every Day     Current packs/day: 0.50     Types: Cigarettes    Smokeless tobacco: Never   Vaping Use    Vaping status: Never Used   Substance Use Topics    Alcohol use: Not Currently    Drug use: Never       Physical Exam   ED Triage Vitals [06/25/24 1515]   Temperature Heart Rate Respirations BP   36 °C (96.8 °F) 69 18 (!) 224/104      Pulse Ox Temp src Heart Rate Source Patient Position   98 % -- -- --      BP Location FiO2 (%)     -- --       Physical Exam  Vitals and nursing note reviewed.   Constitutional:       Appearance: Normal appearance.   HENT:      Head: Atraumatic.      Right Ear: External ear normal.      Left Ear: External ear normal.      Nose: Nose normal.      Mouth/Throat:      Mouth: Mucous membranes are moist.   Eyes:      General: Visual field deficit present.      Extraocular Movements: Extraocular movements intact.      Right eye: Abnormal extraocular motion present.      Pupils: Pupils are  equal, round, and reactive to light.      Comments: Internal strabismus of the right eye   Cardiovascular:      Rate and Rhythm: Normal rate and regular rhythm.      Pulses: Normal pulses.   Pulmonary:      Effort: Pulmonary effort is normal.      Breath sounds: Normal breath sounds.   Abdominal:      Palpations: Abdomen is soft.      Tenderness: There is no abdominal tenderness.   Musculoskeletal:         General: No tenderness. Normal range of motion.      Cervical back: Normal range of motion and neck supple. No rigidity or tenderness.   Skin:     General: Skin is warm and dry.   Neurological:      General: No focal deficit present.      Mental Status: She is alert and oriented to person, place, and time. Mental status is at baseline.      Cranial Nerves: Cranial nerve deficit present.      Sensory: Sensory deficit present.      Motor: Weakness present.      Comments: Right facial asymmetry.  Right arm drift.  Right leg drift.  Decree sensation right side of the face and right arm.    NIH 6     Psychiatric:         Mood and Affect: Mood normal.         Behavior: Behavior normal.         ED Course & MDM   ED Course as of 06/25/24 2232   [facility], [facility]Jun 25, 202425, 2024 2205 Discussed lab and CT findings with the patient.  Patient agrees with plan admission. [JA]   2209 Discussed with Dr. Fitzgerald and he accepts the patient to his service.  [JA]      ED Course User Index  [JA] Gordo Betancur DO         Diagnoses as of 06/25/24 2232   Acute CVA (cerebrovascular accident) (Multi)   Hypertension, unspecified type       Medical Decision Making  Differential diagnosis: CVA, TIA, intracranial mass, intracranial hemorrhage, infection, other    Last known well 2 weeks ago.  Out of window for TNK or thrombectomy.  NIH 6.    Patient presenting for right-sided deficits.  Patient is out of window for intervention.  Patient does smoke cigarettes.  Patient intermittently uses aspirin.  CT showing concern for lacunar infarcts.  Patient admitted  for further evaluation.  No significant leukocytosis or anemia.  No gross electrolyte abnormality.  EKG shows sinus bradycardia.        Procedure  ECG 12 lead    Performed by: Gordo Betancur DO  Authorized by: Gordo Betancur DO    ECG interpreted by ED Physician in the absence of a cardiologist: yes    Comments:      6/25/2024 22: 11 sinus bradycardia, rate 57, normal axis, ST segments normal, T waves normal, nonspecific EKG.  EKG interpreted by myself       Gordo Betancur DO  06/25/24 [id number]

## 2024-06-26 ENCOUNTER — APPOINTMENT (OUTPATIENT)
Dept: RADIOLOGY | Facility: HOSPITAL | Age: 64
End: 2024-06-26
Payer: COMMERCIAL

## 2024-06-26 ENCOUNTER — APPOINTMENT (OUTPATIENT)
Dept: CARDIOLOGY | Facility: HOSPITAL | Age: 64
End: 2024-06-26
Payer: COMMERCIAL

## 2024-06-26 LAB
ANION GAP SERPL CALC-SCNC: 11 MMOL/L (ref 10–20)
ATRIAL RATE: 56 BPM
BODY SURFACE AREA: 1.73 M2
BUN SERPL-MCNC: 12 MG/DL (ref 6–23)
CALCIUM SERPL-MCNC: 9.1 MG/DL (ref 8.6–10.3)
CHLORIDE SERPL-SCNC: 106 MMOL/L (ref 98–107)
CO2 SERPL-SCNC: 27 MMOL/L (ref 21–32)
CREAT SERPL-MCNC: 0.59 MG/DL (ref 0.5–1.05)
EGFRCR SERPLBLD CKD-EPI 2021: >90 ML/MIN/1.73M*2
ERYTHROCYTE [DISTWIDTH] IN BLOOD BY AUTOMATED COUNT: 13.2 % (ref 11.5–14.5)
EST. AVERAGE GLUCOSE BLD GHB EST-MCNC: 103 MG/DL
GLUCOSE BLD MANUAL STRIP-MCNC: 83 MG/DL (ref 74–99)
GLUCOSE BLD MANUAL STRIP-MCNC: 86 MG/DL (ref 74–99)
GLUCOSE BLD MANUAL STRIP-MCNC: 87 MG/DL (ref 74–99)
GLUCOSE SERPL-MCNC: 87 MG/DL (ref 74–99)
HBA1C MFR BLD: 5.2 %
HCT VFR BLD AUTO: 38.1 % (ref 36–46)
HGB BLD-MCNC: 12.9 G/DL (ref 12–16)
HOLD SPECIMEN: NORMAL
MAGNESIUM SERPL-MCNC: 1.98 MG/DL (ref 1.6–2.4)
MCH RBC QN AUTO: 33 PG (ref 26–34)
MCHC RBC AUTO-ENTMCNC: 33.9 G/DL (ref 32–36)
MCV RBC AUTO: 97 FL (ref 80–100)
NRBC BLD-RTO: 0 /100 WBCS (ref 0–0)
P AXIS: -10 DEGREES
PLATELET # BLD AUTO: 225 X10*3/UL (ref 150–450)
POTASSIUM SERPL-SCNC: 3.5 MMOL/L (ref 3.5–5.3)
PR INTERVAL: 121 MS
Q ONSET: 249 MS
QRS COUNT: 10 BEATS
QRS DURATION: 100 MS
QT INTERVAL: 425 MS
QTC CALCULATION(BAZETT): 414 MS
QTC FREDERICIA: 417 MS
R AXIS: 28 DEGREES
RBC # BLD AUTO: 3.91 X10*6/UL (ref 4–5.2)
SODIUM SERPL-SCNC: 140 MMOL/L (ref 136–145)
T AXIS: 49 DEGREES
T OFFSET: 462 MS
VENTRICULAR RATE: 57 BPM
WBC # BLD AUTO: 5.9 X10*3/UL (ref 4.4–11.3)

## 2024-06-26 PROCEDURE — 97161 PT EVAL LOW COMPLEX 20 MIN: CPT | Mod: GP

## 2024-06-26 PROCEDURE — G0378 HOSPITAL OBSERVATION PER HR: HCPCS

## 2024-06-26 PROCEDURE — 2500000001 HC RX 250 WO HCPCS SELF ADMINISTERED DRUGS (ALT 637 FOR MEDICARE OP)

## 2024-06-26 PROCEDURE — 82947 ASSAY GLUCOSE BLOOD QUANT: CPT

## 2024-06-26 PROCEDURE — 70544 MR ANGIOGRAPHY HEAD W/O DYE: CPT | Mod: 59

## 2024-06-26 PROCEDURE — 85027 COMPLETE CBC AUTOMATED: CPT

## 2024-06-26 PROCEDURE — 92610 EVALUATE SWALLOWING FUNCTION: CPT | Mod: GN

## 2024-06-26 PROCEDURE — 99223 1ST HOSP IP/OBS HIGH 75: CPT | Performed by: PSYCHIATRY & NEUROLOGY

## 2024-06-26 PROCEDURE — 70551 MRI BRAIN STEM W/O DYE: CPT | Performed by: RADIOLOGY

## 2024-06-26 PROCEDURE — 83735 ASSAY OF MAGNESIUM: CPT

## 2024-06-26 PROCEDURE — 36415 COLL VENOUS BLD VENIPUNCTURE: CPT

## 2024-06-26 PROCEDURE — 97165 OT EVAL LOW COMPLEX 30 MIN: CPT | Mod: GO

## 2024-06-26 PROCEDURE — 70547 MR ANGIOGRAPHY NECK W/O DYE: CPT | Mod: DISTINCT PROCEDURAL SERVICE | Performed by: RADIOLOGY

## 2024-06-26 PROCEDURE — 99236 HOSP IP/OBS SAME DATE HI 85: CPT

## 2024-06-26 PROCEDURE — 96372 THER/PROPH/DIAG INJ SC/IM: CPT

## 2024-06-26 PROCEDURE — 70547 MR ANGIOGRAPHY NECK W/O DYE: CPT | Mod: 59

## 2024-06-26 PROCEDURE — 93246 EXT ECG>7D<15D RECORDING: CPT

## 2024-06-26 PROCEDURE — 70551 MRI BRAIN STEM W/O DYE: CPT

## 2024-06-26 PROCEDURE — 2500000004 HC RX 250 GENERAL PHARMACY W/ HCPCS (ALT 636 FOR OP/ED)

## 2024-06-26 PROCEDURE — 83036 HEMOGLOBIN GLYCOSYLATED A1C: CPT

## 2024-06-26 PROCEDURE — 80048 BASIC METABOLIC PNL TOTAL CA: CPT

## 2024-06-26 RX ORDER — ASPIRIN 81 MG/1
81 TABLET ORAL DAILY
Qty: 30 TABLET | Refills: 0 | Status: SHIPPED | OUTPATIENT
Start: 2024-06-27 | End: 2024-07-27

## 2024-06-26 RX ORDER — AMLODIPINE BESYLATE 5 MG/1
5 TABLET ORAL DAILY
Qty: 30 TABLET | Refills: 0 | Status: SHIPPED | OUTPATIENT
Start: 2024-06-27 | End: 2024-07-27

## 2024-06-26 RX ORDER — AMLODIPINE BESYLATE 5 MG/1
5 TABLET ORAL DAILY
Status: DISCONTINUED | OUTPATIENT
Start: 2024-06-26 | End: 2024-06-27 | Stop reason: HOSPADM

## 2024-06-26 RX ORDER — CLOPIDOGREL BISULFATE 75 MG/1
75 TABLET ORAL DAILY
Qty: 20 TABLET | Refills: 0 | Status: SHIPPED | OUTPATIENT
Start: 2024-06-27 | End: 2024-07-17

## 2024-06-26 RX ORDER — HYDRALAZINE HYDROCHLORIDE 20 MG/ML
5 INJECTION INTRAMUSCULAR; INTRAVENOUS EVERY 6 HOURS PRN
Status: DISCONTINUED | OUTPATIENT
Start: 2024-06-26 | End: 2024-06-27 | Stop reason: HOSPADM

## 2024-06-26 RX ORDER — LISINOPRIL 40 MG/1
40 TABLET ORAL DAILY
Status: DISCONTINUED | OUTPATIENT
Start: 2024-06-26 | End: 2024-06-27 | Stop reason: HOSPADM

## 2024-06-26 RX ORDER — ATORVASTATIN CALCIUM 40 MG/1
40 TABLET, FILM COATED ORAL NIGHTLY
Qty: 30 TABLET | Refills: 0 | Status: SHIPPED | OUTPATIENT
Start: 2024-06-26 | End: 2024-07-26

## 2024-06-26 RX ORDER — CLOPIDOGREL BISULFATE 75 MG/1
75 TABLET ORAL DAILY
Status: DISCONTINUED | OUTPATIENT
Start: 2024-06-26 | End: 2024-06-27 | Stop reason: HOSPADM

## 2024-06-26 SDOH — SOCIAL STABILITY: SOCIAL INSECURITY: WERE YOU ABLE TO COMPLETE ALL THE BEHAVIORAL HEALTH SCREENINGS?: YES

## 2024-06-26 SDOH — SOCIAL STABILITY: SOCIAL INSECURITY: DOES ANYONE TRY TO KEEP YOU FROM HAVING/CONTACTING OTHER FRIENDS OR DOING THINGS OUTSIDE YOUR HOME?: NO

## 2024-06-26 SDOH — SOCIAL STABILITY: SOCIAL INSECURITY: DO YOU FEEL ANYONE HAS EXPLOITED OR TAKEN ADVANTAGE OF YOU FINANCIALLY OR OF YOUR PERSONAL PROPERTY?: NO

## 2024-06-26 SDOH — SOCIAL STABILITY: SOCIAL INSECURITY: HAVE YOU HAD ANY THOUGHTS OF HARMING ANYONE ELSE?: NO

## 2024-06-26 SDOH — SOCIAL STABILITY: SOCIAL INSECURITY: DO YOU FEEL UNSAFE GOING BACK TO THE PLACE WHERE YOU ARE LIVING?: NO

## 2024-06-26 SDOH — SOCIAL STABILITY: SOCIAL INSECURITY: ABUSE: ADULT

## 2024-06-26 SDOH — SOCIAL STABILITY: SOCIAL INSECURITY
ASK PARENT OR GUARDIAN: ARE THERE TIMES WHEN YOU, YOUR CHILD(REN), OR ANY MEMBER OF YOUR HOUSEHOLD FEEL UNSAFE, HARMED, OR THREATENED AROUND PERSONS WITH WHOM YOU KNOW OR LIVE?: NO

## 2024-06-26 SDOH — SOCIAL STABILITY: SOCIAL INSECURITY: HAVE YOU HAD THOUGHTS OF HARMING ANYONE ELSE?: NO

## 2024-06-26 SDOH — ECONOMIC STABILITY: HOUSING INSECURITY: DO YOU FEEL UNSAFE GOING BACK TO THE PLACE WHERE YOU LIVE?: NO

## 2024-06-26 SDOH — SOCIAL STABILITY: SOCIAL INSECURITY: HAS ANYONE EVER THREATENED TO HURT YOUR FAMILY OR YOUR PETS?: NO

## 2024-06-26 SDOH — SOCIAL STABILITY: SOCIAL INSECURITY: ARE THERE ANY APPARENT SIGNS OF INJURIES/BEHAVIORS THAT COULD BE RELATED TO ABUSE/NEGLECT?: NO

## 2024-06-26 SDOH — SOCIAL STABILITY: SOCIAL INSECURITY: ARE YOU OR HAVE YOU BEEN THREATENED OR ABUSED PHYSICALLY, EMOTIONALLY, OR SEXUALLY BY ANYONE?: NO

## 2024-06-26 ASSESSMENT — COGNITIVE AND FUNCTIONAL STATUS - GENERAL
WALKING IN HOSPITAL ROOM: A LITTLE
MOBILITY SCORE: 20
MOBILITY SCORE: 23
DAILY ACTIVITIY SCORE: 24
CLIMB 3 TO 5 STEPS WITH RAILING: A LITTLE
PATIENT BASELINE BEDBOUND: NO
TOILETING: A LITTLE
CLIMB 3 TO 5 STEPS WITH RAILING: A LITTLE
DAILY ACTIVITIY SCORE: 22
MOVING TO AND FROM BED TO CHAIR: A LITTLE
STANDING UP FROM CHAIR USING ARMS: A LITTLE
HELP NEEDED FOR BATHING: A LITTLE

## 2024-06-26 ASSESSMENT — ACTIVITIES OF DAILY LIVING (ADL)
TOILETING: NEEDS ASSISTANCE
DRESSING YOURSELF: INDEPENDENT
BATHING: INDEPENDENT
FEEDING YOURSELF: INDEPENDENT
LACK_OF_TRANSPORTATION: NO
JUDGMENT_ADEQUATE_SAFELY_COMPLETE_DAILY_ACTIVITIES: YES
PATIENT'S MEMORY ADEQUATE TO SAFELY COMPLETE DAILY ACTIVITIES?: YES
HEARING - LEFT EAR: FUNCTIONAL
WALKS IN HOME: NEEDS ASSISTANCE
HEARING - RIGHT EAR: FUNCTIONAL
ADEQUATE_TO_COMPLETE_ADL: YES
GROOMING: INDEPENDENT

## 2024-06-26 ASSESSMENT — PATIENT HEALTH QUESTIONNAIRE - PHQ9
SUM OF ALL RESPONSES TO PHQ9 QUESTIONS 1 & 2: 0
2. FEELING DOWN, DEPRESSED OR HOPELESS: NOT AT ALL
1. LITTLE INTEREST OR PLEASURE IN DOING THINGS: NOT AT ALL
SUM OF ALL RESPONSES TO PHQ9 QUESTIONS 1 & 2: 0
1. LITTLE INTEREST OR PLEASURE IN DOING THINGS: NOT AT ALL
2. FEELING DOWN, DEPRESSED OR HOPELESS: NOT AT ALL

## 2024-06-26 ASSESSMENT — LIFESTYLE VARIABLES
HOW OFTEN DO YOU HAVE A DRINK CONTAINING ALCOHOL: NEVER
AUDIT-C TOTAL SCORE: 0
HOW OFTEN DO YOU HAVE 6 OR MORE DRINKS ON ONE OCCASION: NEVER
HOW OFTEN DO YOU HAVE A DRINK CONTAINING ALCOHOL: NEVER
AUDIT-C TOTAL SCORE: 0
SKIP TO QUESTIONS 9-10: 1
PRESCIPTION_ABUSE_PAST_12_MONTHS: NO
SUBSTANCE_ABUSE_PAST_12_MONTHS: NO
PRESCIPTION_ABUSE_PAST_12_MONTHS: NO
AUDIT-C TOTAL SCORE: 0
HOW MANY STANDARD DRINKS CONTAINING ALCOHOL DO YOU HAVE ON A TYPICAL DAY: PATIENT DOES NOT DRINK
AUDIT-C TOTAL SCORE: 0
SKIP TO QUESTIONS 9-10: 1
HOW MANY STANDARD DRINKS CONTAINING ALCOHOL DO YOU HAVE ON A TYPICAL DAY: PATIENT DOES NOT DRINK
SUBSTANCE_ABUSE_PAST_12_MONTHS: NO
HOW OFTEN DO YOU HAVE 6 OR MORE DRINKS ON ONE OCCASION: NEVER

## 2024-06-26 ASSESSMENT — PAIN - FUNCTIONAL ASSESSMENT
PAIN_FUNCTIONAL_ASSESSMENT: 0-10
PAIN_FUNCTIONAL_ASSESSMENT: 0-10

## 2024-06-26 ASSESSMENT — PAIN SCALES - GENERAL
PAINLEVEL_OUTOF10: 3
PAINLEVEL_OUTOF10: 0 - NO PAIN
PAINLEVEL_OUTOF10: 0 - NO PAIN

## 2024-06-26 NOTE — NURSING NOTE
06/26/24 1230 Patient Navigator  I introduced myself to the patient and explained my role. Pt states she lives with her  and son which both can assist her as needed. She states she works frequently how ever doesn't do exercise. I informed her of the recommendations of 30 minutes 3 times a week. Pt states she doesn't always  eat a healthy diet.  I reviewed the stroke folder and the handouts listed below. I reviewed the following lab values and what they indicate: cholesterol, HDL, LDL, & triglycerides. She admits that she has been smoking cigarettes since she was 15 years old. I did  the patient on smoking cessation. I gave the patient my business card & instructed them to call me as needed. I updated the patient's RN of my visit. She appreciated the information and provided. Please see the education tab for additional information.    Handouts:   Stroke folder  Understanding smoking & stroke- World Stroke Organization  How do I follow a healthy diet pattern? American Heart Association  What can I eat? American Diabetes Association  Lifestyle changes to prevent a stroke- American Stroke Association     Christy MARTIN, RN  Patient Navigator  Stroke Educator  Diabetes Care &

## 2024-06-26 NOTE — CARE PLAN
The patient's goals for the shift include remain normal    The clinical goals for the shift include no further signs of neurological deterioration

## 2024-06-26 NOTE — CONSULTS
Inpatient consult to Neurology  Consult performed by: Anshu Schwartz MD  Consult ordered by: Gordo Betancur DO          History Of Present Illness  Deanne Gerbracht is a 63 y.o. female presenting with vision changes.  The patient states that for the last several weeks she has had some vision changes in her right eye.  The patient went to see her ophthalmologist who called another ophthalmologist who said to send her to the ER for an evaluation to rule out stroke.  The patient was seen in the ER and was noted to have deviation of her right eye, right facial droop, right facial numbness and right-sided weakness.  She is also had difficulty walking for 2 to 3 days.  She has had difficulty swallowing food and has had 2 choking episodes.  The patient told me that she does not remember having any of these symptoms in the ER.  Currently she feels back to her baseline denies any headache, nausea, vomiting, double vision, facial or extremity weakness or numbness or walking problems.  She states that she will have some speech problems when she gets aggravated and that occasionally her right knee will buckle while walking.  The patient does smoke half pack cigarettes a day.  The patient had a CT scan brain done in the ER that showed a questionable new lacunar infarction in the left occipital white matter.      Past Medical History  Past Medical History:   Diagnosis Date    Carpal tunnel syndrome of right wrist     Hypertension     Hypertensive urgency 04/18/2019    Pneumonia      Surgical History  Past Surgical History:   Procedure Laterality Date    CARPAL TUNNEL RELEASE Right      Social History  Social History     Tobacco Use    Smoking status: Every Day     Current packs/day: 0.50     Types: Cigarettes    Smokeless tobacco: Never   Vaping Use    Vaping status: Never Used   Substance Use Topics    Alcohol use: Not Currently    Drug use: Never   The patient used to smoke 2 packs of cigarettes a day and did that for about  "15 years.    Allergies  Cefaclor, Cefuroxime, and Shellfish derived  Medications Prior to Admission   Medication Sig Dispense Refill Last Dose    ibuprofen (Advil Migraine) capsule Take 2 capsules (400 mg) by mouth 4 times a day as needed.   Past Month    lisinopril 40 mg tablet Take 1 tablet (40 mg) by mouth once daily. 90 tablet 3 6/25/2024    meclizine (Antivert) 12.5 mg tablet Take 1 tablet (12.5 mg) by mouth 3 times a day as needed for dizziness. 30 tablet 3 Past Month    amLODIPine (Norvasc) 5 mg tablet Take 1 tablet (5 mg) by mouth once daily. 30 tablet 5     atorvastatin (Lipitor) 10 mg tablet Take 1 tablet (10 mg) by mouth once daily. (Patient taking differently: Take 1 tablet (10 mg) by mouth once daily at bedtime.) 30 tablet 5 6/24/2024     Family history  The patient states that her mother has a history of arthritis and her father has a history of heart disease.  Both her mother and father had hypertension.    Review of Systems   Eyes:  Positive for visual disturbance.   All other systems reviewed and are negative.        Neurological Exam  Physical Exam  Last Recorded Vitals  Blood pressure (!) 187/82, pulse 53, temperature 36.8 °C (98.2 °F), temperature source Temporal, resp. rate 20, height 1.702 m (5' 7\"), weight 63.1 kg (139 lb 1.8 oz), SpO2 95%.  The patient is a well developed, [well-nourished] [female] in no acute distress.    The patient's funduscopic examination shows no papilledema bilaterally.    The patient's extremity examination shows that the pulses are 2+ in the upper and lower extremities bilaterally and there is no edema in the lower extremities bilaterally.    The patient's mental status testing is alert and oriented ×3 with no evidence of aphasia or dysarthria.  The patient's memory testing, fund of knowledge and concentration are all within normal limits.  The patient's cranial nerves 2, 3, 4, 5, 6, 7, 8, 9, 10, 11 and 12 are all within normal limits.  The patient's motor testing " shows normal tone, bulk, and power in the upper and lower extremities bilaterally.  The patient's sensory testing is intact to light touch in the upper and lower extremities bilaterally.  The patient's cerebellar testing is intact in the upper and lower extremities bilaterally.  The patient's station and gait are normal.  The patient's reflexes are 1+ in the upper and lower extremities and symmetrical.    Relevant Results        NIH Stroke Scale  1A. Level of Consciousness: Alert, Keenly Responsive  1B. Ask Month and Age: Both Questions Right  1C. Blink Eyes & Squeeze Hands: Performs Both Tasks  2. Best Gaze: Normal  3. Visual: No Visual Loss  4. Facial Palsy: Normal Symmetrical Movements  5A. Motor - Left Arm: No Drift  5B. Motor - Right Arm: No Drift  6A. Motor - Left Leg: No Drift  6B. Motor - Right Leg: No Drift  7. Limb Ataxia: Absent  8. Sensory Loss: Normal  9. Best Language: No Aphasia  10. Dysarthria: Normal  11. Extinction and Inattention: No Abnormality  NIH Stroke Scale: 0           [place] Coma Scale  Best Eye Response: Spontaneous  Best Verbal Response: Oriented  Best Motor Response: Follows commands  [place] Coma Scale Score: 15              No current facility-administered medications on file prior to encounter.     Current Outpatient Medications on File Prior to Encounter   Medication Sig Dispense Refill    ibuprofen (Advil Migraine) capsule Take 2 capsules (400 mg) by mouth 4 times a day as needed.      lisinopril 40 mg tablet Take 1 tablet (40 mg) by mouth once daily. 90 tablet 3    meclizine (Antivert) 12.5 mg tablet Take 1 tablet (12.5 mg) by mouth 3 times a day as needed for dizziness. 30 tablet 3    amLODIPine (Norvasc) 5 mg tablet Take 1 tablet (5 mg) by mouth once daily. 30 tablet 5    atorvastatin (Lipitor) 10 mg tablet Take 1 tablet (10 mg) by mouth once daily. (Patient taking differently: Take 1 tablet (10 mg) by mouth once daily at bedtime.) 30 tablet 5       Results for orders placed or  performed during the hospital encounter of 06/25/24 (from the past 96 hour(s))   CBC and Auto Differential   Result Value Ref Range    WBC 6.9 4.4 - 11.3 x10*3/uL    nRBC 0.0 0.0 - 0.0 /100 WBCs    RBC 4.44 4.00 - 5.20 x10*6/uL    Hemoglobin 14.7 12.0 - 16.0 g/dL    Hematocrit 44.6 36.0 - 46.0 %     (H) 80 - 100 fL    MCH 33.1 26.0 - 34.0 pg    MCHC 33.0 32.0 - 36.0 g/dL    RDW 13.3 11.5 - 14.5 %    Platelets 240 150 - 450 x10*3/uL    Immature Granulocytes %, Automated 0.3 0.0 - 0.9 %    Immature Granulocytes Absolute, Automated 0.02 0.00 - 0.70 x10*3/uL   Magnesium   Result Value Ref Range    Magnesium 2.09 1.60 - 2.40 mg/dL   Comprehensive metabolic panel   Result Value Ref Range    Glucose 85 74 - 99 mg/dL    Sodium 138 136 - 145 mmol/L    Potassium 4.0 3.5 - 5.3 mmol/L    Chloride 106 98 - 107 mmol/L    Bicarbonate 26 21 - 32 mmol/L    Anion Gap 10 10 - 20 mmol/L    Urea Nitrogen 14 6 - 23 mg/dL    Creatinine 0.78 0.50 - 1.05 mg/dL    eGFR 85 >60 mL/min/1.73m*2    Calcium 9.9 8.6 - 10.3 mg/dL    Albumin 4.8 3.4 - 5.0 g/dL    Alkaline Phosphatase 85 33 - 136 U/L    Total Protein 8.2 6.4 - 8.2 g/dL    AST 18 9 - 39 U/L    Bilirubin, Total 0.6 0.0 - 1.2 mg/dL    ALT 14 7 - 45 U/L   Protime-INR   Result Value Ref Range    Protime 11.1 9.8 - 12.8 seconds    INR 1.0 0.9 - 1.1   aPTT   Result Value Ref Range    aPTT 33 27 - 38 seconds   Lactate   Result Value Ref Range    Lactate 0.6 0.4 - 2.0 mmol/L   B-Type Natriuretic Peptide   Result Value Ref Range    BNP 74 0 - 99 pg/mL   Type And Screen   Result Value Ref Range    ABO TYPE A     Rh TYPE POS     ANTIBODY SCREEN NEG    Troponin I, High Sensitivity, Initial   Result Value Ref Range    Troponin I, High Sensitivity 5 0 - 13 ng/L   Manual Differential   Result Value Ref Range    Neutrophils %, Manual 49.0 40.0 - 80.0 %    Bands %, Manual 4.0 0.0 - 5.0 %    Lymphocytes %, Manual 37.0 13.0 - 44.0 %    Monocytes %, Manual 5.0 2.0 - 10.0 %    Eosinophils %, Manual  3.0 0.0 - 6.0 %    Basophils %, Manual 0.0 0.0 - 2.0 %    Atypical Lymphocytes %, Manual 2.0 0.0 - 2.0 %    Seg Neutrophils Absolute, Manual 3.38 1.20 - 7.00 x10*3/uL    Bands Absolute, Manual 0.28 0.00 - 0.70 x10*3/uL    Lymphocytes Absolute, Manual 2.55 1.20 - 4.80 x10*3/uL    Monocytes Absolute, Manual 0.35 0.10 - 1.00 x10*3/uL    Eosinophils Absolute, Manual 0.21 0.00 - 0.70 x10*3/uL    Basophils Absolute, Manual 0.00 0.00 - 0.10 x10*3/uL    Atypical Lymphs Absolute, Manual 0.14 0.00 - 0.50 x10*3/uL    Total Cells Counted 100     Neutrophils Absolute, Manual 3.66 1.20 - 7.70 x10*3/uL    RBC Morphology No significant RBC morphology present    Lipid Panel   Result Value Ref Range    Cholesterol 263 (H) 0 - 199 mg/dL    HDL-Cholesterol 52.1 mg/dL    Cholesterol/HDL Ratio 5.0     LDL Calculated 184 (H) <=99 mg/dL    VLDL 27 0 - 40 mg/dL    Triglycerides 136 0 - 149 mg/dL    Non HDL Cholesterol 211 (H) 0 - 149 mg/dL   ECG 12 lead   Result Value Ref Range    Ventricular Rate 57 BPM    Atrial Rate 56 BPM    DC Interval 121 ms    QRS Duration 100 ms    QT Interval 425 ms    QTC Calculation(Bazett) 414 ms    P Axis -10 degrees    R Axis 28 degrees    T Axis 49 degrees    QRS Count 10 beats    Q Onset 249 ms    T Offset 462 ms    QTC Fredericia 417 ms   Troponin, High Sensitivity, 1 Hour   Result Value Ref Range    Troponin I, High Sensitivity 6 0 - 13 ng/L   Urinalysis with Reflex Culture and Microscopic   Result Value Ref Range    Color, Urine Colorless (N) Light-Yellow, Yellow, Dark-Yellow    Appearance, Urine Clear Clear    Specific Gravity, Urine 1.004 (N) 1.005 - 1.035    pH, Urine 6.5 5.0, 5.5, 6.0, 6.5, 7.0, 7.5, 8.0    Protein, Urine NEGATIVE NEGATIVE, 10 (TRACE), 20 (TRACE) mg/dL    Glucose, Urine Normal Normal mg/dL    Blood, Urine NEGATIVE NEGATIVE    Ketones, Urine NEGATIVE NEGATIVE mg/dL    Bilirubin, Urine NEGATIVE NEGATIVE    Urobilinogen, Urine Normal Normal mg/dL    Nitrite, Urine NEGATIVE NEGATIVE     Leukocyte Esterase, Urine NEGATIVE NEGATIVE   Extra Urine Gray Tube   Result Value Ref Range    Extra Tube Hold for add-ons.    POCT GLUCOSE   Result Value Ref Range    POCT Glucose 86 74 - 99 mg/dL   CBC   Result Value Ref Range    WBC 5.9 4.4 - 11.3 x10*3/uL    nRBC 0.0 0.0 - 0.0 /100 WBCs    RBC 3.91 (L) 4.00 - 5.20 x10*6/uL    Hemoglobin 12.9 12.0 - 16.0 g/dL    Hematocrit 38.1 36.0 - 46.0 %    MCV 97 80 - 100 fL    MCH 33.0 26.0 - 34.0 pg    MCHC 33.9 32.0 - 36.0 g/dL    RDW 13.2 11.5 - 14.5 %    Platelets 225 150 - 450 x10*3/uL   Basic Metabolic Panel   Result Value Ref Range    Glucose 87 74 - 99 mg/dL    Sodium 140 136 - 145 mmol/L    Potassium 3.5 3.5 - 5.3 mmol/L    Chloride 106 98 - 107 mmol/L    Bicarbonate 27 21 - 32 mmol/L    Anion Gap 11 10 - 20 mmol/L    Urea Nitrogen 12 6 - 23 mg/dL    Creatinine 0.59 0.50 - 1.05 mg/dL    eGFR >90 >60 mL/min/1.73m*2    Calcium 9.1 8.6 - 10.3 mg/dL   Magnesium   Result Value Ref Range    Magnesium 1.98 1.60 - 2.40 mg/dL   Hemoglobin A1C   Result Value Ref Range    Hemoglobin A1C 5.2 see below %    Estimated Average Glucose 103 Not Established mg/dL   POCT GLUCOSE   Result Value Ref Range    POCT Glucose 87 74 - 99 mg/dL   POCT GLUCOSE   Result Value Ref Range    POCT Glucose 83 74 - 99 mg/dL          I have personally reviewed the following imaging results ECG 12 lead    Result Date: 6/26/2024  Sinus bradycardia    MR angio head wo IV contrast    Result Date: 6/26/2024  Interpreted By:  Shara Zhao  and Titus Salgado STUDY: MR ANGIO NECK WO IV CONTRAST; MR ANGIO HEAD WO IV CONTRAST; 6/26/2024 8:55 am   INDICATION: Signs/Symptoms:stroke workup.  Stroke protocol.   COMPARISON: MRI brain 06/06/2024, CT head 06/25/2024   ACCESSION NUMBER(S): [id number]; [id number]   ORDERING CLINICIAN: ROMULO CLARK   TECHNIQUE: Time-of-flight MRA of the head  and neck was performed. The images were reviewed as source images and maximum intensity projections.   FINDINGS: MRA of  head:   Anterior circulation:   There is a 0.3 x 0.2 cm superiorly directed outpouching arising from the anterior communicating artery compatible with an aneurysm.   There is a 0.3 x 0.3 cm outpouching at the right MCA bifurcation compatible with an aneurysm.   There is expected flow signal in bilateral intracranial internal carotid arteries, bilateral carotid terminals, right proximal anterior and bilateral middle cerebral arteries.   Posterior circulation: Bilateral intracranial vertebral arteries, vertebrobasilar junction, basilar artery and proximal posterior cerebral arteries demonstrate expected flow signal. The posterior cerebral arteries are mainly supplied by robust posterior communicating arteries.   MRA of neck:   The source images are mildly degraded by artifact.   Right carotid vessels:  There is expected flow signal in the visualized portion of the common carotid artery.  There is mild attenuation of flow signal at the carotid bifurcation which may be secondary to flow related artifact. The internal carotid artery in the neck demonstrates expected flow signal.   Left carotid vessels:   There is expected flow signal in the visualized portion of the common carotid artery.  There is mild attenuation of flow signal at the carotid bifurcation which may be secondary to flow related artifact. The internal carotid artery in the neck demonstrates expected flow signal.   Vertebral vessels:   The visualized segments of the cervical vertebral arteries demonstrate expected flow signal.       MRA:   No evidence of major vessel cutoff or significant stenosis on MRA head and neck.   There is a 0.3 cm superiorly directed aneurysm from the anterior communicating artery.   There is a 0.3 cm aneurysm at the right MCA bifurcation.   I personally reviewed the images/study and I agree with the findings as stated by Damian Qureshi MD. This study was interpreted at [facility], [place], [place].    MACRO: None   Signed by: Shara Zhao 6/26/2024 9:49 AM Dictation workstation:   [id number]    MR angio neck wo IV contrast    Result Date: 6/26/2024  Interpreted By:  Shara Zhao and Nakamoto Kent STUDY: MR ANGIO NECK WO IV CONTRAST; MR ANGIO HEAD WO IV CONTRAST; 6/26/2024 8:55 am   INDICATION: Signs/Symptoms:stroke workup.  Stroke protocol.   COMPARISON: MRI brain 06/06/2024, CT head 06/25/2024   ACCESSION NUMBER(S): [id number]; [id number]   ORDERING CLINICIAN: ROMULO CLARK   TECHNIQUE: Time-of-flight MRA of the head  and neck was performed. The images were reviewed as source images and maximum intensity projections.   FINDINGS: MRA of head:   Anterior circulation:   There is a 0.3 x 0.2 cm superiorly directed outpouching arising from the anterior communicating artery compatible with an aneurysm.   There is a 0.3 x 0.3 cm outpouching at the right MCA bifurcation compatible with an aneurysm.   There is expected flow signal in bilateral intracranial internal carotid arteries, bilateral carotid terminals, right proximal anterior and bilateral middle cerebral arteries.   Posterior circulation: Bilateral intracranial vertebral arteries, vertebrobasilar junction, basilar artery and proximal posterior cerebral arteries demonstrate expected flow signal. The posterior cerebral arteries are mainly supplied by robust posterior communicating arteries.   MRA of neck:   The source images are mildly degraded by artifact.   Right carotid vessels:  There is expected flow signal in the visualized portion of the common carotid artery.  There is mild attenuation of flow signal at the carotid bifurcation which may be secondary to flow related artifact. The internal carotid artery in the neck demonstrates expected flow signal.   Left carotid vessels:   There is expected flow signal in the visualized portion of the common carotid artery.  There is mild attenuation of flow signal at the carotid bifurcation which may be secondary  to flow related artifact. The internal carotid artery in the neck demonstrates expected flow signal.   Vertebral vessels:   The visualized segments of the cervical vertebral arteries demonstrate expected flow signal.       MRA:   No evidence of major vessel cutoff or significant stenosis on MRA head and neck.   There is a 0.3 cm superiorly directed aneurysm from the anterior communicating artery.   There is a 0.3 cm aneurysm at the right MCA bifurcation.   I personally reviewed the images/study and I agree with the findings as stated by Damian Qureshi MD. This study was interpreted at [facility], [place], [place].   MACRO: None   Signed by: Shara Zhao 6/26/2024 9:49 AM Dictation workstation:   [id number]    MR brain wo IV contrast    Result Date: 6/26/2024  Interpreted By:  Shara Zhao and Nakamoto Kent STUDY: MR BRAIN WO IV CONTRAST;  6/26/2024 8:55 am   INDICATION: Signs/Symptoms:CVA left occipital lobe   COMPARISON: CT head 06/25/2024   ACCESSION NUMBER(S): [id number]   ORDERING CLINICIAN: ROMULO CLARK   TECHNIQUE: Axial T2, FLAIR, DWI, gradient echo T2 and sagittal and coronal T1 weighted images of brain were acquired.   FINDINGS: No diffusion restriction abnormality to suggest acute infarct. No blooming artifact to suggest hemorrhage. No herniation, midline shift, or mass effect.   Mild degree of nonspecific subcortical and periventricular T2 and FLAIR hyperintense signal is compatible with microangiopathy. Old lacunar infarcts within the left basal ganglia and thalamus.   CSF Spaces: The ventricles, sulci and basal cisterns are within normal limits. There is no extra-axial fluid collection.   Paranasal Sinuses and Mastoids: Minimal right-sided mastoid fluid. Visualized paranasal sinuses and mastoid air cells are unremarkable.       No diffusion restriction abnormality to suggest acute infarct.   Mild degree of nonspecific white matter signal compatible with  microangiopathy.   Old lacunar infarcts within the left basal ganglia and thalamus.   I personally reviewed the images/study and I agree with the findings as stated by Damian Qureshi MD. This study was interpreted at [facility], [place], [place].   MACRO: None   Signed by: Shara Zhao 6/26/2024 9:33 AM Dictation workstation:   [id number]    CT head wo IV contrast    Result Date: 6/25/2024  Interpreted By:  Deirdre Mckee, STUDY: CT HEAD WO IV CONTRAST;  6/25/2024 4:15 pm   INDICATION: Signs/Symptoms:cva.  Right eye deviation and facial droop.   COMPARISON: None.   ACCESSION NUMBER(S): [id number]   ORDERING CLINICIAN: ROMULO CLARK   TECHNIQUE: Noncontrast axial CT scan of head was performed.   FINDINGS: Parenchyma: There is no intracranial hemorrhage. The grey-white differentiation is intact. There is no mass effect or midline shift. Punctate hypodensity in the left occipital white matter (series 201, image 18).   CSF Spaces: The ventricles, sulci and basal cisterns are within normal limits for age.   Extra-Axial Fluid: No extraaxial fluid collection.   Calvarium: No acute fracture.   Paranasal sinuses: Visualized paranasal sinuses are clear.   Mastoids: Clear.   Orbits: Normal.   Soft tissues: Unremarkable.       Nonspecific punctate left occipital white matter hypodensity could represent possible small age-indeterminate lacunar infarct. Otherwise, no acute intracranial abnormality. Consider MRI evaluation as warranted.   MACRO Deirdre Mckee discussed the significance and urgency of this critical finding by Epic secure chat with  ROMULO CLARK on 6/25/2024 at 5:32 pm.  (**-RCF-**) Findings:  See findings.     Signed by: Deirdre Mckee 6/25/2024 5:33 PM Dictation workstation:   [id number]    XR chest 1 view    Result Date: 6/25/2024  Interpreted By:  Anshu Deng, STUDY: XR CHEST 1 VIEW;  6/25/2024 3:35 pm   INDICATION: Signs/Symptoms:cva.   COMPARISON: 03/12/2023   ACCESSION  NUMBER(S): [id number]   ORDERING CLINICIAN: RMOULO CLARK   FINDINGS: PA radiograph of the chest was provided.     CARDIOMEDIASTINAL SILHOUETTE: Cardiomediastinal silhouette is stable in size and configuration. Tortuosity of the thoracic aorta is again apparent along with minimal calcified plaque visible in the aortic arch. No mediastinal or hilar leonard enlargement is apparent.   LUNGS: Lungs are clear. There is no mass or nodule. The pleural spaces appear clear.   ABDOMEN: No remarkable upper abdominal findings.   BONES: No acute osseous changes.       1.  No evidence of acute cardiopulmonary process.       MACRO: None   Signed by: Anshu Deng 6/25/2024 3:57 PM Dictation workstation:   [id number]    The patient had a cardiac monitor done this past March that showed no atrial fibrillation but she did have brief episodes of atrial tachycardia.    The patient also had an echocardiogram done this past March that showed severe LVH and her LV function was normal with no clot noted.     Assessment/Plan   Impression: The patient is a 63-year-old female with multiple stroke risk factors  Who presents with multiple neurological complaints in the ER.  Currently she is back to her baseline.  Her neurological examination is normal.  Most likely the patient suffered a TIA and the differential diagnosis includes a cardiac source of embolism, intra or extracranial atherosclerotic disease or small vessel stroke.  I cannot rule out the possibility of conversion disorder.  The patient does have multiple cerebral aneurysms noted but these are small.    Plan: The patient is doing well from a neurological standpoint.  The patient will need a Zio patch for 2 weeks.  The patient will need to be on Plavix 75 mg a day and aspirin 81 mg a day for 21 days.  After 21 days, the Plavix can be discontinued and aspirin can be continued at 81 mg a day.  The patient certainly is to be on a statin and needs to stop smoking cigarettes.  The  patient needs improved blood pressure control.  The patient needs to continue stroke risk factor modification.   The patient needs neurochecks as per protocol.  The patient does need an appointment with neurosurgery as an outpatient for her cerebral aneurysms.  I will send the note to Dr. Clemons.  Thank you very much for sending me this very interesting consultation.  I discussed all these issues in detail with the patient and answered all their questions.  The patient can be discharged home from my standpoint.  The patient needs follow-up with their primary care doctor within 2 weeks of discharge.  On discharge, the patient will follow up with me in the office in 4 months.      Anshu Schwartz MD

## 2024-06-26 NOTE — PROGRESS NOTES
Occupational Therapy    Evaluation    Patient Name: Deanne Gerbracht  MRN: [id number]  Today's Date: 6/26/2024  Time Calculation  Start Time: 0950  Stop Time: 1008  Time Calculation (min): 18 min        Assessment:  End of Session Patient Position: Bed, 1 rail up, Alarm off, not on at start of session (hob elevated to comfort, call light in reach, all needs met)  Strengths: Capable of completing ADLs semi/independent, Living arrangement secure, Physical health, Premorbid level of function, Support and attitude of living partners, Support of extended family/friends  Plan:  No Skilled OT: At baseline function  OT Frequency: OT eval only  OT - OK to Discharge: Yes (once medically appropriate to next level of care)       Subjective   Current Problem:  1. Acute CVA (cerebrovascular accident) (Multi)        2. Hypertension, unspecified type          General:  General  Reason for Referral: OT eval and tx; ADLs. dx: #Subacute left occipital lacunar infarct  #Dysphagia with recent aspiration  #Hypertensive urgency/emergency  chest x-ray: negative. CT head: Nonspecific punctate left occipital white matter hypodensity could  represent possible small age-indeterminate lacunar infarct.  Otherwise, no acute intracranial abnormality. MRI: No diffusion restriction abnormality to suggest acute infarct.      Mild degree of nonspecific white matter signal compatible with  microangiopathy.      Old lacunar infarcts within the left basal ganglia and thalamus. MRA:    No evidence of major vessel cutoff or significant stenosis on MRA  head and neck.      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      There is a 0.3 cm aneurysm at the right MCA bifurcatio  Referred By: Kaci  Past Medical History Relevant to Rehab: 63 y.o. female with a PMHx of HTN with urgency, DLD, GERD, multiple falls, TUD 0.5 PPD presenting to [facility] ED with R facial droop and blurry vision - sent from ophthalmology  office.  Co-Treatment: PT  Co-Treatment Reason: for safety and to maximize therapeutic performance  Prior to Session Communication: Bedside nurse  Patient Position Received:  (patient lying in ED cart at start and end of eval with 1 rail up, call light in reach, all needs met. no alarm present. tele in place)  General Comment: patient reports occassional blurred vision, but overall symptoms resolved, feels back at baseline  Precautions:  Precautions Comment: fall, corrective lenses full time    Pain:  Pain Assessment  Pain Assessment:  (reports headache 5/10)    Objective   Cognition:  Overall Cognitive Status: Within Functional Limits           Home Living:  Type of Home:  (per patient report, lives with son and spouse, 4 RICHELLE. patient has 1st floor bedroom/bathroom.)  Bathroom Shower/Tub:  (tub shower, grab bar, handheld shower)  Bathroom Toilet:  (standard toilet with grab bar)  Prior Function:  Level of [place]:  (independent in ADLs/IADLs. drives. no AD use. owns cane. reports a few recent falls. sleeps in standard bed. reports can assist as needed. laundry in basement, HR on stairs)    ADL:  Toileting Assistance with Device:  (MOD I for toileting in bathroom)    Bed Mobility/Transfers: Bed Mobility  Bed Mobility:  (completed supine <-->sit at MOD I on ED cart)    Transfers  Transfer:  (completed STS at MOD I without AD and raising/lowering from toilet at MOD I without AD)      Functional Mobility:  Functional Mobility  Functional Mobility Performed:  (engaged in simple mobility at MOD I without AD)     Sensation:  Sensation Comment: denies numbness/tingling  Strength:  Strength Comments: BUE ROM/MMT WFL  Perception:     Coordination:  Movements are Fluid and Coordinated: Yes  Finger to Nose: Intact       Outcome Measures:[facility] Daily Activity  Putting on and taking off regular lower body clothing: None  Bathing (including washing, rinsing, drying): None  Putting on and taking off regular upper body  clothing: None  Toileting, which includes using toilet, bedpan or urinal: None  Taking care of personal grooming such as brushing teeth: None  Eating Meals: None  Daily Activity - Total Score: 24        Education Documentation  Body Mechanics, taught by Kerri Vazquez OT at 6/26/2024 12:48 PM.  Learner: Patient  Readiness: Acceptance  Method: Explanation  Response: Verbalizes Understanding    ADL Training, taught by Kerri Vazquez OT at 6/26/2024 12:48 PM.  Learner: Patient  Readiness: Acceptance  Method: Explanation  Response: Verbalizes Understanding    Education Comments  No comments found.

## 2024-06-26 NOTE — PROGRESS NOTES
Physical Therapy    Physical Therapy Evaluation    Patient Name: Deanne Gerbracht  MRN: [id number]  Today's Date: 6/26/2024   Time Calculation  Start Time: 0950  Stop Time: 1008  Time Calculation (min): 18 min  AC32/AC32    Assessment/Plan   PT Assessment  Evaluation/Treatment Tolerance: Patient tolerated treatment well  Assessment Comment: Pt is functioning at baseline, independent mobility, no skilled PT needs  End of Session Patient Position: Bed, 1 rail up, Alarm off, not on at start of session (hob elevated to comfort, call light in reach, all needs met)  IP OR SWING BED PT PLAN  Inpatient or Swing Bed: Inpatient  PT Plan  PT Plan: PT Eval only  PT Eval Only Reason: At baseline function  PT Discharge Recommendations: No further acute PT, No PT needed after discharge  PT - OK to Discharge: Yes (when cleared by medical team)    Subjective     Current Problem:  1. Acute CVA (cerebrovascular accident) (Multi)        2. Hypertension, unspecified type          Patient Active Problem List   Diagnosis    Primary hypertension    GERD (gastroesophageal reflux disease)    Hyperlipidemia    Anxiety    Abnormal chest x-ray    Chest pain    Lumbosacral spondylosis without myelopathy    Klebsiella infection    Median nerve compression in right forearm    Pyelonephritis    Right hand pain    Right median nerve neuropathy    Smoker    Sprain of deltoid ligament of ankle    Abnormal finding on radiological examination of breast    Acute low back pain    Acute sinusitis    Congestion of paranasal sinus    Dizziness and giddiness    Fall    Hemoptysis    Hip strain, right, initial encounter    Knee pain    Knee strain, right, initial encounter    Leukocytosis    Pain of right hip joint    Pneumonia    Sinus arrhythmia seen on electrocardiography    Palpitations    Livedo reticularis    Syncope and collapse    Acute CVA (cerebrovascular accident) (Multi)    Occipital stroke (Multi)       General Visit Information:  General  Reason  for Referral: PT eval & treat/impaired mobility; DX:  Subacute left occipital lacunar infarct , Dysphagia with recent aspiration   Hypertensive urgency/emergency   63 y.o. female with a PMHx of HTN with urgency, DLD, GERD, multiple falls, TUD 0.5 PPD presenting to [facility] ED with R facial droop and blurry vision - sent from ophthalmology office. (MRI:No diffusion restriction abnormality to suggest acute infarct.      Mild degree of nonspecific white matter signal compatible with  microangiopathy.      Old lacunar infarcts within the left basal ganglia and thalamus.)  Referred By: Malik Clemons DO  Caregiver Feedback: Per conference w/ RN patient stable to participate in therapy  Co-Treatment: OT  Co-Treatment Reason: to maximize pt safety & mobility  Patient Position Received: Bed, 1 rail up, On cart  General Comment: Pleasant & cooperative, receptive to mobility& instructions; reports occasional blurred vision but overall symptoms resovled/note mobility being back to base line; a&ox4    Home Living:  Home Living  Home Living Comments: Lives w/ spouse & son, 4steps w/o rail to enter home; basement laundry w/ rail on stairs otherwise 1st fl set up; tub shower w/ grab bar & hand held shower hose; standard ht toilet w/grab bar; standard bed    Prior Level of Function:  Prior Function Per Pt/Caregiver Report  Prior Function Comments: indpendent mobility w/o use of device, owns a cane; h/o falling 10/2023 2/2 rle buckling; independent adl; iadl shared; +drives    Precautions:  Precautions  Precautions Comment: fall, corrective lenses full time  Pain:  Pain Assessment  Pain Assessment:  (headache pain rated 5/10)    General Assessments:    Sensation  Light Touch: No apparent deficits        Coordination  Alternating Toe Taps: Intact  Heel to [place]: Intact   Functional Assessments:     Bed Mobility  Bed Mobility:  (independent supine<>sit)  Transfers  Transfer:  (independent sit<>stand w/o use of device, no dizziness w/  "position changes)  Ambulation/Gait Training  Ambulation/Gait Training Performed:  (independent w/o use of device [id number], increased lateral sway w/ decreased heel strike & push off bilaterally, improved as gait progressed, pt notes this being baseline as \"stiffness works out\")      Extremity/Trunk Assessments:        RLE   RLE :  (hip flexion 4/5 otherwise wfl)  LLE   LLE : Within Functional Limits    Outcome Measures:     [facility] Basic Mobility  Turning from your back to your side while in a flat bed without using bedrails: None  Moving from lying on your back to sitting on the side of a flat bed without using bedrails: None  Moving to and from bed to chair (including a wheelchair): None  Standing up from a chair using your arms (e.g. wheelchair or bedside chair): None  To walk in hospital room: None  Climbing 3-5 steps with railing: A little  Basic Mobility - Total Score: 23        Education Documentation  Mobility Training, taught by Enid Goldstein, PT at 6/26/2024 12:25 PM.  Learner: Patient  Readiness: Acceptance  Method: Explanation  Response: Verbalizes Understanding  Comment: safety, step to step le pattern for safe stair negotiation       "

## 2024-06-26 NOTE — DISCHARGE SUMMARY
Discharge Diagnosis  #TIA  #History of prior lacunar stroke  #Tobacco abuse  #Hypertension      Issues Requiring Follow-Up  Take aspirin and plavix for a total of 21 days. After this 21 day period, you will continue aspirin daily. Your atorvastatin dose has been increased to 40mg daily. Also take lisinopril 40mg daily and amlodipine 5mg daily for high blood pressure.     Follow up with neurology in 4 months (Dr Anshu Schwartz).    Follow up with neurosurgery in 2 weeks for your cerebral aneurysms, I have placed a referral to neurosurgery, they will contact you for an appointment.    Wear your heart monitor for 2 weeks, then follow up with your PCP.     Discharge Meds     Your medication list        START taking these medications        Instructions Last Dose Given Next Dose Due   aspirin 81 mg EC tablet  Start taking on: June 27, 2024      Take 1 tablet (81 mg) by mouth once daily.       clopidogrel 75 mg tablet  Commonly known as: Plavix  Start taking on: June 27, 2024      Take 1 tablet (75 mg) by mouth once daily for 20 doses.              CHANGE how you take these medications        Instructions Last Dose Given Next Dose Due   atorvastatin 40 mg tablet  Commonly known as: Lipitor  What changed:   medication strength  how much to take  when to take this      Take 1 tablet (40 mg) by mouth once daily at bedtime.              CONTINUE taking these medications        Instructions Last Dose Given Next Dose Due   amLODIPine 5 mg tablet  Commonly known as: Norvasc  Start taking on: June 27, 2024      Take 1 tablet (5 mg) by mouth once daily.       lisinopril 40 mg tablet      Take 1 tablet (40 mg) by mouth once daily.       meclizine 12.5 mg tablet  Commonly known as: Antivert      Take 1 tablet (12.5 mg) by mouth 3 times a day as needed for dizziness.              STOP taking these medications      Advil Migraine capsule  Generic drug: ibuprofen                  Where to Get Your Medications        These medications  "were sent to [facility] [address] [place] [address] - [facility]  [address] [facility] [address]      Phone: [phone]   amLODIPine 5 mg tablet  aspirin 81 mg EC tablet  atorvastatin 40 mg tablet  clopidogrel 75 mg tablet         Test Results Pending At Discharge  Pending Labs       No current pending labs.            Hospital Course   Deanne Gerbracht is a 63 y.o. female with a PMHx of HTN with urgency, DLD, GERD, multiple falls, TUD 0.5 PPD presenting to [facility] ED with R facial droop and blurry vision - sent from ophthalmology office. Symptoms: 2 weeks of right eye deviation, right facial droop, right facial numbness, right-sided weakness. Difficulty walking (2-3 days of shuffling gait). Difficulty swallowing food, 2 choking episodes, right side of her neck feels different and \"nodular. ED course: On admission, /104 with HR 69 bpm, respiratory VS WNL, afebrile.  Labs, CXR unremarkable.  ECG sinus bradycardia at 57 bpm.  CTh without IV contrast showing possible new lacunar infarct in the left occipital white matter. Interventions:  in ED. Admitted for CVA investigation and treatment.  MRI brain showed no diffusion restriction abnormality to suggest acute infarct.  There are old lacunar infarcts within the left basal ganglia and thalamus.  She passed her swallow evaluation without issue.  The patient was recommended to be on DAPT for 21 days, Zio patch, statin per neurology.  She was cleared for discharge from a neurologic perspective.  She will follow-up with her PCP, neurology, neurosurgery as well for her cerebral aneurysms, referral was placed.  She was discharged home on amlodipine 5 mg and lisinopril 40 mg for better control of her blood pressure.    Pertinent Physical Exam At Time of Discharge  Physical Exam  HENT:      Head: Normocephalic and atraumatic.      Right Ear: External ear normal.      Left Ear: External ear normal.   Eyes:      Extraocular Movements: Extraocular " movements intact.      Pupils: Pupils are equal, round, and reactive to light.   Cardiovascular:      Rate and Rhythm: Normal rate and regular rhythm.   Pulmonary:      Effort: Pulmonary effort is normal. No respiratory distress.   Abdominal:      General: Abdomen is flat.      Palpations: Abdomen is soft.   Musculoskeletal:      Right lower leg: No edema.      Left lower leg: No edema.   Neurological:      General: No focal deficit present.   Psychiatric:         Mood and Affect: Mood normal.         Behavior: Behavior normal.           Outpatient Follow-Up  Future Appointments   Date Time Provider Department Center   10/14/2024 11:00 AM Ascencion Sutton MD [id number] [facility] DO Keesha

## 2024-06-26 NOTE — H&P
"Medicine H&P        Subjective   Deanne Gerbracht is a 63 y.o. female with a PMHx of HTN with urgency, DLD, GERD, multiple falls, TUD 0.5 PPD presenting to [facility] ED with R facial droop and blurry vision - sent from ophthalmology office.    Last known well: 2 weeks ago  Symptoms: 2 weeks of right eye deviation, right facial droop, right facial numbness, right-sided weakness. Difficulty walking (2-3 days of shuffling gait). Difficulty swallowing food, 2 choking episodes, right side of her neck feels different and \"nodular\".    Of note:  Past holter monitor showed brief runs of atrial tachycardia.    Nuclear medicine stress test was low risk for ischemia.    Transthoracic echocardiogram on 3/8/24 showed normal LV systolic function; LVH - unable to find info on PFO status.     ED course: On admission, /104 with HR 69 bpm, respiratory VS WNL, afebrile.  Labs, CXR unremarkable.  ECG sinus bradycardia at 57 bpm.  CTh without IV contrast showing possible new lacunar infarct in the left occipital white matter. Interventions:  in ED. Admitted for CVA investigation and treatment.     A 10 point ROS was performed with the patient denying any complaint at this time aside from those listed in the HPI above.     Current Outpatient Medications   Medication Instructions    amLODIPine (NORVASC) 5 mg, oral, Daily    atorvastatin (LIPITOR) 10 mg, oral, Daily    ibuprofen (Advil Migraine) capsule     lisinopril 40 mg, oral, Daily    meclizine (ANTIVERT) 12.5 mg, oral, 3 times daily PRN      Past Medical History:   Diagnosis Date    Carpal tunnel syndrome of right wrist     Hypertension     Hypertensive urgency 04/18/2019    Pneumonia       Past Surgical History:   Procedure Laterality Date    CARPAL TUNNEL RELEASE Right      Family History   Problem Relation Name Age of Onset    Arthritis Mother      Heart disease Father       Soc hx: TUD 0.5 ppd    Allergies   Allergen Reactions    Cefaclor Hives and Swelling    Cefuroxime " "Hives    Shellfish Derived Hives     Code Status: No Order    Objective   BP (!) 194/109   Pulse 63   Temp 36 °C (96.8 °F)   Resp 18   Ht 1.702 m (5' 7\")   Wt 63.5 kg (140 lb)   SpO2 98%   BMI 21.93 kg/m²     Labs, radiological imaging and cardiac work up were personally reviewed    Labs:   Results from last 7 days   Lab Units 06/25/24  1818   SODIUM mmol/L 138   POTASSIUM mmol/L 4.0   CHLORIDE mmol/L 106   CO2 mmol/L 26   BUN mg/dL 14   CREATININE mg/dL 0.78   GLUCOSE mg/dL 85   CALCIUM mg/dL 9.9     Results from last 7 days   Lab Units 06/25/24  1818   WBC AUTO x10*3/uL 6.9   HEMOGLOBIN g/dL 14.7   HEMATOCRIT % 44.6   PLATELETS AUTO x10*3/uL 240     Imaging:  CT head wo IV contrast   Final Result   Nonspecific punctate left occipital white matter hypodensity could   represent possible small age-indeterminate lacunar infarct.   Otherwise, no acute intracranial abnormality. Consider MRI evaluation   as warranted.        ROMY Mckee discussed the significance and urgency of this   critical finding by Epic secure chat with  ROMULO CLARK on 6/25/2024   at 5:32 pm.  (**-RCF-**) Findings:  See findings.     Physical Exam:   GENERAL: Awake/alert, cooperative, NAD.  HEAD: Normocephalic, atraumatic.  EYES: Round and reactive. Anicteric.  NECK: No JVD, cervical lymphadenopathy bilaterally.  CARDIOVASCULAR: Borderline bradycardic, nonhypoxic.  RESPIRATORY: CTAB, good air entry bilaterally on RA.  ABDOMEN:  Soft, no tenderness, nondistended.  EXTREMITIES:  No peripheral edema, no calf tenderness. Peripheral pulses intact.  SKIN:  Warm and dry.  NEUROLOGICAL: A&O x 4. NIHSS as below.    NIH Stroke Scale:  1A. Level of Consciousness: Alert, keenly responsive: 0 points  1B. Ask Month and Age: Both questions right:  0 points  1C. Blink Eyes & Squeeze Hands: Performs both tasks: 0 points  2. Best Gaze: Normal: 0 points  3. Visual: No visual loss: 0 points  4. Facial Palsy: Minor right-sided paralysis with smile " asymmetry: +1 point  5A. Motor: Left Arm: No drift: 0 points  5B. Motor: Right Arm: Drift but does not hit bed: +1 point  6A. Motor: Left Leg: No Drift  6B. Motor: Right Leg: Drift but does not hit bed: +1 point  7. Limb Ataxia: Ataxia in RLE: +1 point  8. Sensory Loss: Mild numbness and tingling in right hand and right side of face: +1 point  9. Best Language: Mild aphasia with slight word finding difficulty: +1 point  10. Dysarthria: Normal: 0 points  11. Extinction and Inattention: No Abnormality: 0 points    NIH Stroke Scale: 6    Assessment/Plan   Deanne Gerbracht is a 63 y.o. female with a PMHx of HTN with urgency, DLD, GERD, multiple falls, TUD 0.5 PPD presenting to [facility] ED with R facial droop and blurry vision - sent from ophthalmology office.     #Subacute left occipital lacunar infarct  #Dysphagia with recent aspiration  #Hypertensive urgency/emergency  NIHSS 6 - Right facial palsy, right facial numbness, right arm drift, right hand numbness/tingling, right leg drift, right leg ataxia, slight word finding difficulty    -Out of window for TNK or thrombectomy  -Neurochecks every 2 hours, NIHSS for any neurological deterioration and shift change  -Continue telemetry for continuous cardiac monitoring  -Echocardiogram deferred, patient had one in April  -ASA 324mg given in the ED, ASA 81mg QD starting tomorrow AM   -Deferring Plavix in the setting of heightened risk of bleeding from her recurrent falls  -IV hydralazine 5 mg every 20 minutes as needed for SBP >220 mmHg or DBP >120  -STAT MRI of the brain without contrast, MR angio head and neck without IV contrast ordered  -SLP consulted for swallow eval, NPO except sips with meds for now  -Neurology consulted    Checklist:  DVT ppx: Lovenox  Glycemic control: POCT as needed  Lines: PIV  Consults: Neurology, PT/OT/SLP/SW/TCC  Code Status: Full code    Herbert Colindres,    Internal Medicine PGY-1     This is a preliminary note written by the resident. Please  wait for attending addendum for finalization of note and recommendations.

## 2024-06-26 NOTE — DISCHARGE INSTRUCTIONS
Take aspirin and plavix for a total of 21 days. After this 21 day period, you will continue aspirin daily. Your atorvastatin dose has been increased to 40mg daily. Also take lisinopril 40mg daily and amlodipine 5mg daily for high blood pressure.     Follow up with neurology in 4 months (Dr Anshu Schwartz).    Follow up with neurosurgery in 2 weeks for your cerebral aneurysms, I have placed a referral to neurosurgery.    Wear your heart monitor for 2 weeks, then follow up with your PCP.

## 2024-06-26 NOTE — PROGRESS NOTES
06/26/24 8854   Discharge Planning   Living Arrangements Family members   Support Systems Family members   Assistance Needed PTA - reports she lives with her spouse and son. Has 1st floor set up for bed/bath. Tub shower w/ grab bar & hand held showerhead. Independent for ADLs, shares IADLs with family. Does not use any DME   Type of Residence Private residence   Number of Stairs to Enter Residence 4   Home or Post Acute Services None   Patient expects to be discharged to: HOME   Does the patient need discharge transport arranged? No     Pt lives with spouse and son. Ambulates independently. No DMEs. PT [facility], no further therapy needed at [place].

## 2024-06-26 NOTE — PROGRESS NOTES
Pharmacy Medication History Review    Deanne Gerbracht is a 63 y.o. female admitted for Acute CVA (cerebrovascular accident) (Multi). Pharmacy reviewed the patient's [id number] medications and allergies for accuracy.    The list below reflectives the updated PTA list. Please review each medication in order reconciliation for additional clarification and justification.  Prior to Admission medications    Medication Sig Start Date End Date Taking? Authorizing Provider   ibuprofen (Advil Migraine) capsule Take 2 capsules (400 mg) by mouth 4 times a day as needed.   Yes Historical Provider, MD   lisinopril 40 mg tablet Take 1 tablet (40 mg) by mouth once daily. 4/10/24 4/10/25 Yes Ascencion Sutton MD   meclizine (Antivert) 12.5 mg tablet Take 1 tablet (12.5 mg) by mouth 3 times a day as needed for dizziness. 1/3/24 1/2/25 Yes JESSICA Jernigan   amLODIPine (Norvasc) 5 mg tablet Take 1 tablet (5 mg) by mouth once daily. 1/31/24 7/29/24 no JESSICA Jernigan   atorvastatin (Lipitor) 10 mg tablet Take 1 tablet (10 mg) by mouth once daily.  Patient taking differently: Take 1 tablet (10 mg) by mouth once daily at bedtime. 12/13/23 6/10/24 yes JESSICA Jernigan        The list below reflectives the updated allergy list. Please review each documented allergy for additional clarification and justification.  Allergies  Reviewed by Tay Harris, EMT on 6/25/2024        Severity Reactions Comments    Cefaclor Medium Hives, Swelling     Cefuroxime Medium Hives     Shellfish Derived Medium Hives             Below are additional concerns with the patient's PTA list.      Yessica Moscoso

## 2024-06-27 ENCOUNTER — DOCUMENTATION (OUTPATIENT)
Dept: INPATIENT UNIT | Facility: HOSPITAL | Age: 64
End: 2024-06-27
Payer: COMMERCIAL

## 2024-06-27 VITALS
TEMPERATURE: 97.2 F | BODY MASS INDEX: 21.83 KG/M2 | OXYGEN SATURATION: 95 % | HEIGHT: 67 IN | WEIGHT: 139.11 LBS | DIASTOLIC BLOOD PRESSURE: 78 MMHG | HEART RATE: 63 BPM | SYSTOLIC BLOOD PRESSURE: 139 MMHG | RESPIRATION RATE: 20 BRPM

## 2024-06-27 LAB
ANION GAP SERPL CALC-SCNC: 13 MMOL/L (ref 10–20)
BUN SERPL-MCNC: 13 MG/DL (ref 6–23)
CALCIUM SERPL-MCNC: 9.6 MG/DL (ref 8.6–10.3)
CHLORIDE SERPL-SCNC: 106 MMOL/L (ref 98–107)
CO2 SERPL-SCNC: 24 MMOL/L (ref 21–32)
CREAT SERPL-MCNC: 0.61 MG/DL (ref 0.5–1.05)
EGFRCR SERPLBLD CKD-EPI 2021: >90 ML/MIN/1.73M*2
ERYTHROCYTE [DISTWIDTH] IN BLOOD BY AUTOMATED COUNT: 13.2 % (ref 11.5–14.5)
GLUCOSE SERPL-MCNC: 90 MG/DL (ref 74–99)
HCT VFR BLD AUTO: 41.3 % (ref 36–46)
HGB BLD-MCNC: 14.2 G/DL (ref 12–16)
MAGNESIUM SERPL-MCNC: 2.07 MG/DL (ref 1.6–2.4)
MCH RBC QN AUTO: 33.4 PG (ref 26–34)
MCHC RBC AUTO-ENTMCNC: 34.4 G/DL (ref 32–36)
MCV RBC AUTO: 97 FL (ref 80–100)
NRBC BLD-RTO: 0 /100 WBCS (ref 0–0)
PLATELET # BLD AUTO: 255 X10*3/UL (ref 150–450)
POTASSIUM SERPL-SCNC: 4 MMOL/L (ref 3.5–5.3)
RBC # BLD AUTO: 4.25 X10*6/UL (ref 4–5.2)
SODIUM SERPL-SCNC: 139 MMOL/L (ref 136–145)
WBC # BLD AUTO: 9.3 X10*3/UL (ref 4.4–11.3)

## 2024-06-27 PROCEDURE — 80048 BASIC METABOLIC PNL TOTAL CA: CPT

## 2024-06-27 PROCEDURE — 96372 THER/PROPH/DIAG INJ SC/IM: CPT

## 2024-06-27 PROCEDURE — 2500000004 HC RX 250 GENERAL PHARMACY W/ HCPCS (ALT 636 FOR OP/ED)

## 2024-06-27 PROCEDURE — 36415 COLL VENOUS BLD VENIPUNCTURE: CPT

## 2024-06-27 PROCEDURE — 2500000001 HC RX 250 WO HCPCS SELF ADMINISTERED DRUGS (ALT 637 FOR MEDICARE OP)

## 2024-06-27 PROCEDURE — 83735 ASSAY OF MAGNESIUM: CPT

## 2024-06-27 PROCEDURE — 99232 SBSQ HOSP IP/OBS MODERATE 35: CPT

## 2024-06-27 PROCEDURE — G0378 HOSPITAL OBSERVATION PER HR: HCPCS

## 2024-06-27 PROCEDURE — 85027 COMPLETE CBC AUTOMATED: CPT

## 2024-06-27 PROCEDURE — 9420000001 HC RT PATIENT EDUCATION 5 MIN

## 2024-06-27 ASSESSMENT — COGNITIVE AND FUNCTIONAL STATUS - GENERAL
DAILY ACTIVITIY SCORE: 24
MOBILITY SCORE: 24

## 2024-06-27 ASSESSMENT — PAIN SCALES - GENERAL: PAINLEVEL_OUTOF10: 0 - NO PAIN

## 2024-06-27 ASSESSMENT — PAIN - FUNCTIONAL ASSESSMENT: PAIN_FUNCTIONAL_ASSESSMENT: 0-10

## 2024-06-27 NOTE — NURSING NOTE
1000 dr. Thao approved pt to drive home today.   1145 reviewed dc documentation with pt and answered all questions. Pt left unit ambulatory with all belongings in stable condition.

## 2024-06-27 NOTE — PROGRESS NOTES
"Deanne Gerbracht is a 63 y.o. female on day 1 of admission presenting with Acute CVA (cerebrovascular accident) (Multi).    Subjective   HTN overnight, better now as amlodipine takes 24 hours to start working. No complaints, Ready to go home.        Objective     Physical Exam  HENT:      Head: Normocephalic and atraumatic.      Right Ear: External ear normal.      Left Ear: External ear normal.   Eyes:      Extraocular Movements: Extraocular movements intact.      Pupils: Pupils are equal, round, and reactive to light.   Cardiovascular:      Rate and Rhythm: Normal rate and regular rhythm.   Pulmonary:      Effort: Pulmonary effort is normal. No respiratory distress.   Abdominal:      General: Abdomen is flat.      Palpations: Abdomen is soft.   Musculoskeletal:      Right lower leg: No edema.      Left lower leg: No edema.   Neurological:      General: No focal deficit present.   Psychiatric:         Mood and Affect: Mood normal.         Behavior: Behavior normal.     Last Recorded Vitals  Blood pressure 139/78, pulse 63, temperature 36.2 °C (97.2 °F), temperature source Temporal, resp. rate 20, height 1.702 m (5' 7\"), weight 63.1 kg (139 lb 1.8 oz), SpO2 95%.  Intake/Output last 3 Shifts:  No intake/output data recorded.    Relevant Results  ECG 12 lead    Result Date: 6/26/2024  Sinus bradycardia See ED provider note for full interpretation and clinical correlation Confirmed by Erica Galindo ([id number]) on 6/26/2024 3:17:20 PM    MR angio head wo IV contrast    Result Date: 6/26/2024  Interpreted By:  Shara Zhao and Nakamoto Kent STUDY: MR ANGIO NECK WO IV CONTRAST; MR ANGIO HEAD WO IV CONTRAST; 6/26/2024 8:55 am   INDICATION: Signs/Symptoms:stroke workup.  Stroke protocol.   COMPARISON: MRI brain 06/06/2024, CT head 06/25/2024   ACCESSION NUMBER(S): [id number]; [id number]   ORDERING CLINICIAN: ROMULO CLARK   TECHNIQUE: Time-of-flight MRA of the head  and neck was performed. The images were reviewed " as source images and maximum intensity projections.   FINDINGS: MRA of head:   Anterior circulation:   There is a 0.3 x 0.2 cm superiorly directed outpouching arising from the anterior communicating artery compatible with an aneurysm.   There is a 0.3 x 0.3 cm outpouching at the right MCA bifurcation compatible with an aneurysm.   There is expected flow signal in bilateral intracranial internal carotid arteries, bilateral carotid terminals, right proximal anterior and bilateral middle cerebral arteries.   Posterior circulation: Bilateral intracranial vertebral arteries, vertebrobasilar junction, basilar artery and proximal posterior cerebral arteries demonstrate expected flow signal. The posterior cerebral arteries are mainly supplied by robust posterior communicating arteries.   MRA of neck:   The source images are mildly degraded by artifact.   Right carotid vessels:  There is expected flow signal in the visualized portion of the common carotid artery.  There is mild attenuation of flow signal at the carotid bifurcation which may be secondary to flow related artifact. The internal carotid artery in the neck demonstrates expected flow signal.   Left carotid vessels:   There is expected flow signal in the visualized portion of the common carotid artery.  There is mild attenuation of flow signal at the carotid bifurcation which may be secondary to flow related artifact. The internal carotid artery in the neck demonstrates expected flow signal.   Vertebral vessels:   The visualized segments of the cervical vertebral arteries demonstrate expected flow signal.       MRA:   No evidence of major vessel cutoff or significant stenosis on MRA head and neck.   There is a 0.3 cm superiorly directed aneurysm from the anterior communicating artery.   There is a 0.3 cm aneurysm at the right MCA bifurcation.   I personally reviewed the images/study and I agree with the findings as stated by Damian Qureshi MD. This study was  interpreted at [facility], [place], [place].   MACRO: None   Signed by: Shara Zhao 6/26/2024 9:49 AM Dictation workstation:   [id number]    MR angio neck wo IV contrast    Result Date: 6/26/2024  Interpreted By:  Shara Zhao and Nakamoto Kent STUDY: MR ANGIO NECK WO IV CONTRAST; MR ANGIO HEAD WO IV CONTRAST; 6/26/2024 8:55 am   INDICATION: Signs/Symptoms:stroke workup.  Stroke protocol.   COMPARISON: MRI brain 06/06/2024, CT head 06/25/2024   ACCESSION NUMBER(S): [id number]; [id number]   ORDERING CLINICIAN: ROMULO CLARK   TECHNIQUE: Time-of-flight MRA of the head  and neck was performed. The images were reviewed as source images and maximum intensity projections.   FINDINGS: MRA of head:   Anterior circulation:   There is a 0.3 x 0.2 cm superiorly directed outpouching arising from the anterior communicating artery compatible with an aneurysm.   There is a 0.3 x 0.3 cm outpouching at the right MCA bifurcation compatible with an aneurysm.   There is expected flow signal in bilateral intracranial internal carotid arteries, bilateral carotid terminals, right proximal anterior and bilateral middle cerebral arteries.   Posterior circulation: Bilateral intracranial vertebral arteries, vertebrobasilar junction, basilar artery and proximal posterior cerebral arteries demonstrate expected flow signal. The posterior cerebral arteries are mainly supplied by robust posterior communicating arteries.   MRA of neck:   The source images are mildly degraded by artifact.   Right carotid vessels:  There is expected flow signal in the visualized portion of the common carotid artery.  There is mild attenuation of flow signal at the carotid bifurcation which may be secondary to flow related artifact. The internal carotid artery in the neck demonstrates expected flow signal.   Left carotid vessels:   There is expected flow signal in the visualized portion of the common carotid artery.  There is  mild attenuation of flow signal at the carotid bifurcation which may be secondary to flow related artifact. The internal carotid artery in the neck demonstrates expected flow signal.   Vertebral vessels:   The visualized segments of the cervical vertebral arteries demonstrate expected flow signal.       MRA:   No evidence of major vessel cutoff or significant stenosis on MRA head and neck.   There is a 0.3 cm superiorly directed aneurysm from the anterior communicating artery.   There is a 0.3 cm aneurysm at the right MCA bifurcation.   I personally reviewed the images/study and I agree with the findings as stated by Damian Qureshi MD. This study was interpreted at [facility], [place], [place].   MACRO: None   Signed by: Shara Zhao 6/26/2024 9:49 AM Dictation workstation:   [id number]    MR brain wo IV contrast    Result Date: 6/26/2024  Interpreted By:  Shara Zhao and Nakamoto Kent STUDY: MR BRAIN WO IV CONTRAST;  6/26/2024 8:55 am   INDICATION: Signs/Symptoms:CVA left occipital lobe   COMPARISON: CT head 06/25/2024   ACCESSION NUMBER(S): [id number]   ORDERING CLINICIAN: ROMULO CLARK   TECHNIQUE: Axial T2, FLAIR, DWI, gradient echo T2 and sagittal and coronal T1 weighted images of brain were acquired.   FINDINGS: No diffusion restriction abnormality to suggest acute infarct. No blooming artifact to suggest hemorrhage. No herniation, midline shift, or mass effect.   Mild degree of nonspecific subcortical and periventricular T2 and FLAIR hyperintense signal is compatible with microangiopathy. Old lacunar infarcts within the left basal ganglia and thalamus.   CSF Spaces: The ventricles, sulci and basal cisterns are within normal limits. There is no extra-axial fluid collection.   Paranasal Sinuses and Mastoids: Minimal right-sided mastoid fluid. Visualized paranasal sinuses and mastoid air cells are unremarkable.       No diffusion restriction abnormality to suggest acute  infarct.   Mild degree of nonspecific white matter signal compatible with microangiopathy.   Old lacunar infarcts within the left basal ganglia and thalamus.   I personally reviewed the images/study and I agree with the findings as stated by Damian Qureshi MD. This study was interpreted at [facility], [place], [place].   MACRO: None   Signed by: Shara Zhao 6/26/2024 9:33 AM Dictation workstation:   [id number]    CT head wo IV contrast    Result Date: 6/25/2024  Interpreted By:  Deirdre Mckee, STUDY: CT HEAD WO IV CONTRAST;  6/25/2024 4:15 pm   INDICATION: Signs/Symptoms:cva.  Right eye deviation and facial droop.   COMPARISON: None.   ACCESSION NUMBER(S): [id number]   ORDERING CLINICIAN: ROMULO CLARK   TECHNIQUE: Noncontrast axial CT scan of head was performed.   FINDINGS: Parenchyma: There is no intracranial hemorrhage. The grey-white differentiation is intact. There is no mass effect or midline shift. Punctate hypodensity in the left occipital white matter (series 201, image 18).   CSF Spaces: The ventricles, sulci and basal cisterns are within normal limits for age.   Extra-Axial Fluid: No extraaxial fluid collection.   Calvarium: No acute fracture.   Paranasal sinuses: Visualized paranasal sinuses are clear.   Mastoids: Clear.   Orbits: Normal.   Soft tissues: Unremarkable.       Nonspecific punctate left occipital white matter hypodensity could represent possible small age-indeterminate lacunar infarct. Otherwise, no acute intracranial abnormality. Consider MRI evaluation as warranted.   MACRO Deirdre Mckee discussed the significance and urgency of this critical finding by Epic secure chat with  ROMULO CLARK on 6/25/2024 at 5:32 pm.  (**-RCF-**) Findings:  See findings.     Signed by: Deirdre Mckee 6/25/2024 5:33 PM Dictation workstation:   [id number]    XR chest 1 view    Result Date: 6/25/2024  Interpreted By:  Anshu Deng, STUDY: XR CHEST 1 VIEW;  6/25/2024 3:35 pm    INDICATION: Signs/Symptoms:cva.   COMPARISON: 03/12/2023   ACCESSION NUMBER(S): [id number]   ORDERING CLINICIAN: ROMULO CLARK   FINDINGS: PA radiograph of the chest was provided.     CARDIOMEDIASTINAL SILHOUETTE: Cardiomediastinal silhouette is stable in size and configuration. Tortuosity of the thoracic aorta is again apparent along with minimal calcified plaque visible in the aortic arch. No mediastinal or hilar leonard enlargement is apparent.   LUNGS: Lungs are clear. There is no mass or nodule. The pleural spaces appear clear.   ABDOMEN: No remarkable upper abdominal findings.   BONES: No acute osseous changes.       1.  No evidence of acute cardiopulmonary process.       MACRO: None   Signed by: Anshu Deng 6/25/2024 3:57 PM Dictation workstation:   [id number]      Results for orders placed or performed during the hospital encounter of 06/25/24 (from the past 24 hour(s))   POCT GLUCOSE   Result Value Ref Range    POCT Glucose 83 74 - 99 mg/dL   Holter Or Event Cardiac Monitor   Result Value Ref Range    BSA 1.73 m2   CBC   Result Value Ref Range    WBC 9.3 4.4 - 11.3 x10*3/uL    nRBC 0.0 0.0 - 0.0 /100 WBCs    RBC 4.25 4.00 - 5.20 x10*6/uL    Hemoglobin 14.2 12.0 - 16.0 g/dL    Hematocrit 41.3 36.0 - 46.0 %    MCV 97 80 - 100 fL    MCH 33.4 26.0 - 34.0 pg    MCHC 34.4 32.0 - 36.0 g/dL    RDW 13.2 11.5 - 14.5 %    Platelets 255 150 - 450 x10*3/uL   Basic Metabolic Panel   Result Value Ref Range    Glucose 90 74 - 99 mg/dL    Sodium 139 136 - 145 mmol/L    Potassium 4.0 3.5 - 5.3 mmol/L    Chloride 106 98 - 107 mmol/L    Bicarbonate 24 21 - 32 mmol/L    Anion Gap 13 10 - 20 mmol/L    Urea Nitrogen 13 6 - 23 mg/dL    Creatinine 0.61 0.50 - 1.05 mg/dL    eGFR >90 >60 mL/min/1.73m*2    Calcium 9.6 8.6 - 10.3 mg/dL   Magnesium   Result Value Ref Range    Magnesium 2.07 1.60 - 2.40 mg/dL               Assessment/Plan     #TIA  #History of prior lacunar stroke  #Tobacco abuse  #Hypertension      -BP improved, to  discharge on 20 additional days of DAPT, amlodipine and lisinopril for blood pressure control, tobacco cessation encouraged, will wear cardiac monitor for 2 weeks.  She will follow-up with neurosurgery for intracranial aneurysm and neurology and PCP.      Aryan Thao DO

## 2024-06-27 NOTE — NURSING NOTE
Pulmonary Disease Navigator Documentation:    This respiratory therapist met with patient to discuss smoking cessation and review educational pamphlet provided. Patient educated on nicotine addiction, dangers associated with smoking, and risk factors associated with exposure to second hand smoke. Discussed patient's behavioral triggers for smoking, behavioral interventions for successful smoking cessation, and support group availability in the area. Patient has been advised that [facility] are smoke-free facilities.  Patient declined need for NRT or outpatient counseling at this time.

## 2024-06-27 NOTE — CARE PLAN
The patient's goals for the shift include remain normal    The clinical goals for the shift include remain hemodynamically stable    Over the shift, the patient did  make progress toward the following goals. Barriers to progression include none. Recommendations to address these barriers include none.

## 2024-06-27 NOTE — PROGRESS NOTES
Speech-Language Pathology    SLP Adult Inpatient Speech-Language Pathology Clinical Swallow Evaluation    Patient Name: Deanne Gerbracht  MRN: [id number]  Today's Date: 6/27/2024   Time Calculation  Start Time: 1520  Stop Time: 1541  Time Calculation (min): 21 min         Current Problem:   1. Acute CVA (cerebrovascular accident) (Multi)  atorvastatin (Lipitor) 40 mg tablet    aspirin 81 mg EC tablet    clopidogrel (Plavix) 75 mg tablet    Referral to Neurosurgery      2. Hypertension, unspecified type        3. Occipital stroke (Multi)  Holter Or Event Cardiac Monitor    Holter Or Event Cardiac Monitor      4. Primary hypertension  amLODIPine (Norvasc) 5 mg tablet        Assessment:  -Intact oral phase of the swallow, and suspected functional pharyngeal swallow given clinical bedside indicators.   Pt is managing secretions, tongue protrudes to midline, no noticeable facial droop. Speech is intelligible with no evidence of any dysarthria or aphasic errors.  -PO trials cup sips of thin liquids, puree and cookie solids.   -ORAL PHASE: labial seal intact on cup and tsp. There is no anterior loss of the oral bolus. Mastication is slow as a result of no dentition but given extra time she can fully breakdown and clear bolus. Patient reports that she does not have enough gum line to support dentures or dental implants. Oral bolus formation and manipulation WFL for all consistencies. No oral pocketing. No significant oral residue after the swallow.   -PHARYNGEAL PHASE; no overt s/s aspiration with all consistencies. No change in vocal quality or respirations with PO intake. Patient reports occasional sticking of food or pills in her throat but overall no trouble swallowing.     Will suggest pt resume an oral diet, baseline regular textures and thin liquids, no texture restrictions. Patient to choose menu items that she knows she is able to chew effectively as a result of her chronic/baseline edentulous  status.      Recommendations:  Solid Diet Recommendations : Regular (IDDSI Level 7)  Liquid Diet Recommendations: Thin (IDDSI Level 0)  Medication Administration Recommendations: Whole, With Liquid      Plan:  SLP Plan: No skilled SLP warranted at this time.  Discussed POC: Patient and nursing      Subjective   Current Problem:  Assess oropharyngeal swallowing mechanism      General Visit Information:  Patient Class: Inpatient  Living Environment: Home  Past Medical History Relevant to Rehab: 63 y.o. female with a PMHx of HTN with urgency, DLD, GERD, multiple falls, TUD 0.5 PPD presenting to [facility] ED with R facial droop and blurry vision - sent from ophthalmology office.  Prior to Session Communication: Bedside nurse      Vital Signs:   Room air, afebrile, Brain MRI showing old lacunar infarcts within the left basal ganglia and thalamus. CXR indicating lungs are clear.      Objective       Baseline Assessment:  Respiratory Status: Room air  Behavior/Cognition: Alert, Cooperative, Pleasant mood  Patient Positioning: Upright in Bed  Baseline Vocal Quality: Normal      Pain:  0-10 (Numeric) Pain Score: 0 - No pain       Oral/Motor Assessment:  Dentition: Edentulous  Facial Symmetry: Within Functional Limits      Inpatient:  Education Comments  Results/recommendations from swallowing assessment were reviewed with patient and nursing. All parties voiced understanding. Please re-consult SLP if any new swallowing or speech/language concerns arise.

## 2024-07-01 ENCOUNTER — TELEPHONE (OUTPATIENT)
Dept: PRIMARY CARE | Facility: CLINIC | Age: 64
End: 2024-07-01
Payer: COMMERCIAL

## 2024-07-03 ENCOUNTER — OFFICE VISIT (OUTPATIENT)
Dept: PRIMARY CARE | Facility: CLINIC | Age: 64
End: 2024-07-03
Payer: COMMERCIAL

## 2024-07-03 VITALS
RESPIRATION RATE: 16 BRPM | HEIGHT: 67 IN | HEART RATE: 86 BPM | OXYGEN SATURATION: 98 % | TEMPERATURE: 98.9 F | DIASTOLIC BLOOD PRESSURE: 83 MMHG | SYSTOLIC BLOOD PRESSURE: 114 MMHG | WEIGHT: 138.5 LBS | BODY MASS INDEX: 21.74 KG/M2

## 2024-07-03 DIAGNOSIS — I10 PRIMARY HYPERTENSION: ICD-10-CM

## 2024-07-03 DIAGNOSIS — R42 VERTIGO: ICD-10-CM

## 2024-07-03 DIAGNOSIS — I72.9 ANEURYSM (CMS-HCC): ICD-10-CM

## 2024-07-03 DIAGNOSIS — Z09 HOSPITAL DISCHARGE FOLLOW-UP: Primary | ICD-10-CM

## 2024-07-03 DIAGNOSIS — Z87.898 HISTORY OF UNSTEADY GAIT: ICD-10-CM

## 2024-07-03 DIAGNOSIS — I63.9 OCCIPITAL STROKE (MULTI): ICD-10-CM

## 2024-07-03 PROCEDURE — 99214 OFFICE O/P EST MOD 30 MIN: CPT | Performed by: NURSE PRACTITIONER

## 2024-07-03 PROCEDURE — 3079F DIAST BP 80-89 MM HG: CPT | Performed by: NURSE PRACTITIONER

## 2024-07-03 PROCEDURE — 3074F SYST BP LT 130 MM HG: CPT | Performed by: NURSE PRACTITIONER

## 2024-07-03 RX ORDER — MECLIZINE HCL 12.5 MG 12.5 MG/1
12.5 TABLET ORAL 3 TIMES DAILY PRN
Qty: 30 TABLET | Refills: 3 | Status: SHIPPED | OUTPATIENT
Start: 2024-07-03 | End: 2025-07-03

## 2024-07-03 ASSESSMENT — PATIENT HEALTH QUESTIONNAIRE - PHQ9
1. LITTLE INTEREST OR PLEASURE IN DOING THINGS: NOT AT ALL
SUM OF ALL RESPONSES TO PHQ9 QUESTIONS 1 AND 2: 0
2. FEELING DOWN, DEPRESSED OR HOPELESS: NOT AT ALL

## 2024-07-03 ASSESSMENT — ENCOUNTER SYMPTOMS
OCCASIONAL FEELINGS OF UNSTEADINESS: 1
DEPRESSION: 0
LOSS OF SENSATION IN FEET: 1

## 2024-07-03 ASSESSMENT — PAIN SCALES - GENERAL: PAINLEVEL: 0-NO PAIN

## 2024-07-03 NOTE — PROGRESS NOTES
Subjective   Patient ID: Deanne Gerbracht is a 63 y.o. female who presents for Hospital Follow-up (Patient was admitted to hospital 6/25/24 for stroke, released on 6/27/24).  HPI 63-year-old female presents today for follow-up to stroke 6/25/2024.  Patient states that during eye exam they then noted changes to her eye.  She was referred to emergency room for stroke.    Presently smoking --down to 0.5 ppd    Review of hospital records:  6/25/2024 tia, history of lacunar stroke, tobacco abuse, hypertension, cerebral aneurysms.  Recommended by neurology ASA and plavix 21 day then continue daily  ASA  Continue with lisinopril 40 mg and amlodipine 5 mg for hypertension management.    MR Dickain 6/26/2024  MRA neck  Old lacunar infarcts within the left basal ganglia and thalamus.    There is a 0.3 x 0.2 cm superiorly directed outpouching arising from  the anterior communicating artery compatible with an aneurysm.      There is a 0.3 x 0.3 cm outpouching at the right MCA bifurcation  compatible with an aneurysm.      There is expected flow signal in bilateral intracranial internal  carotid arteries, bilateral carotid terminals, right proximal  anterior and bilateral middle cerebral arteries.      Posterior circulation: Bilateral intracranial vertebral arteries,  vertebrobasilar junction, basilar artery and proximal posterior  cerebral arteries demonstrate expected flow signal. The posterior  cerebral arteries are mainly supplied by robust posterior  communicating arteries.  No evidence of major vessel cutoff or significant stenosis on MRA  head and neck.  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  There is a 0.3 cm aneurysm at the right MCA bifurcation.  Review of Systems  Review of systems: Present-feeling well. Not present-chills, fatigue and fever.  Skin: Not present-new lesions and rash.  HEENT: See HPI.  Not present-headache, ear pain, nasal congestion, and sore  "throat.  Neck: Not present-neck pain, neck stiffness and swollen glands.  Respiratory: Not present-difficulty breathing, cough, bloody sputum.  Cardiovascular: Not present-abnormal blood pressure, chest pain, edema, palpitations, dyspnea on exertion.  Gastrointestinal: Not present-abdominal pain, bloody or very black stools, changes in bowel habits, heartburn, jaundice, nausea and vomiting.  Genitourinary: Not present-change in bladder habits, hematuria, flank pain and dysuria.  Musculoskeletal: Not present-back pain, joint pain, joint swelling, joint redness.  Neurological: See HPI.  Not present-dizziness, headache, numbness or tingling.  Psychiatric: Not present-anxiety, depression, suicidal ideation, suicidal planning, thoughts of hurting others and thoughts of self-harm.  Endocrine: Not present- polydipsia, polyuria, and thyroid problems.  Hematology:  Not present-anemia, excessive bleeding, bruising and epistaxis.    Objective   /83   Pulse 86   Temp 37.2 °C (98.9 °F) (Temporal)   Resp 16   Ht 1.702 m (5' 7\")   Wt 62.8 kg (138 lb 8 oz)   SpO2 98%   BMI 21.69 kg/m²      Physical Exam  Gen.: Mental status-alert. Gen. appearance-cooperative, well groomed and consistent with stated age. Not in acute distress or sickly. Orientation-oriented to time, place, purpose and person. Build and nutrition-well-nourished and well-developed. Hydration-well-hydrated.    Integumentary: Color-normal coloration skin. Skin moisture-normal skin moisture.    Head and neck: Head-normocephalic, atraumatic with no lesions or palpable masses. Face-atraumatic. Neck-full range of motion and subtle. No lymphadenopathy and no nuchal rigidity.     ENMT: Ears-no tenderness noted to the external auditory canal-bilaterally. Otoscopic exam: Tympanic membranes-left-tympanic membrane is gray in appearance. Right-tympanic membrane is gray in appearance. Nose -frontal sinuses-non-tender and no purulent drainage. Maxillary " sinuses-non-tender and no purulent drainage. Mouth: Oral mucosa-pink and moist. Oropharynx: No airway distress, bulging of the pharyngeal wall, edema of the uvula, and no pharyngeal erythema.    Chest and lung exam: Auscultation-normal breath sounds, no adventitious lung sounds and normal vocal resonance. Chest wall is normal in shape and non-tender.    Cardiovascular: Auscultation: Regular rate and rhythm. Heart sounds-normal heart sounds, S1-S2. No murmurs or gallops appreciated. Carotid arteries normal and without bruit.    Abdomen: Non-tender, no rigidity, and no palpable masses. There is no hepatosplenomegaly. Auscultation-auscultation of the abdomen reveals normal bowel sounds throughout.     Peripheral vascular: Lower extremity-inspection is normal bilaterally. Normal temperature and no edema bilaterally.    Neurologic: Mental nzqsns-tujghu-trbzzvteiol. Cranial nerves: Cranial nerves II through XII grossly intact. Normal gait.    Musculoskeletal: Examination of the spine with no step-offs or point tenderness.  Full range of motion of the spine without pain or difficulty. Right lower extremity-normal strength and tone, normal range of motion without pain. Left lower extremity-normal strength and tone, normal range of motion without pain.  Assessment/Plan   Diagnoses and all orders for this visit:  Hospital discharge follow-up  Vertigo  -     meclizine (Antivert) 12.5 mg tablet; Take 1 tablet (12.5 mg) by mouth 3 times a day as needed for dizziness.  -     Referral to Physical Therapy; Future  History of unsteady gait  -     Referral to Physical Therapy; Future  Primary hypertension  Occipital stroke (Multi)  Aneurysm (CMS-HCC)

## 2024-07-03 NOTE — PATIENT INSTRUCTIONS
Hospital discharge follow-up for history of lacunar stroke, TIAs, hypertension and smoking history.  Records reviewed.  Patient with aneurysm 0.3 cm superiorly from the anterior communicating artery and the right MCA bifurcation.  Presently asymptomatic.  Presently with heart monitor.  She does have follow-up with Dr. Sutton (cardiology) and Dr. Schwartz (neurology).  Continue with aspirin and Plavix as directed for 21 days then continue daily aspirin.  To ER with worsening condition or recurrence.  History of unsteady gait and vertigo-referred to physical therapy  Meclizine refilled.    Hypertension managed at this time.  Continue with lisinopril 40 mg and amlodipine 5 mg.  Continue to monitor your blood pressure.  Contact office with blood pressure readings greater then 150/90 or less than 90/60.    Follow-up in 1 month or sooner as needed.

## 2024-07-21 LAB — BODY SURFACE AREA: 1.73 M2

## 2024-07-24 ENCOUNTER — MULTIDISCIPLINARY MEETING (OUTPATIENT)
Dept: NEUROSURGERY | Facility: HOSPITAL | Age: 64
End: 2024-07-24
Payer: COMMERCIAL

## 2024-07-24 NOTE — PROGRESS NOTES
Cerebrovascular Conference 07/03/2024    Case Review: PMH HTN, GERD< Chronic smoking, presented with 2 weeks R eye deviation and facial droop. MRI / MRA with 3mm acomm aneurysm and R MCA aneurysm.       Recommendations:  Establish with neurosurgery for formal evaluation with diagnostic angiogram.       Cerebrovascular conference is a multidisciplinary conferences attended by neurosurgery, neuro-radiology, APPs, and Rns.        07/24/24: Referral was placed for neurosurgery. Phone call was made to patient to schedule appointment.  Left voicemail with call back number.

## 2024-08-21 ENCOUNTER — OFFICE VISIT (OUTPATIENT)
Dept: PRIMARY CARE | Facility: CLINIC | Age: 64
End: 2024-08-21
Payer: COMMERCIAL

## 2024-08-21 ENCOUNTER — HOSPITAL ENCOUNTER (OUTPATIENT)
Dept: RADIOLOGY | Facility: HOSPITAL | Age: 64
Discharge: HOME | End: 2024-08-21
Payer: COMMERCIAL

## 2024-08-21 VITALS
HEART RATE: 73 BPM | BODY MASS INDEX: 22.51 KG/M2 | DIASTOLIC BLOOD PRESSURE: 95 MMHG | HEIGHT: 67 IN | TEMPERATURE: 98.3 F | WEIGHT: 143.38 LBS | RESPIRATION RATE: 16 BRPM | SYSTOLIC BLOOD PRESSURE: 160 MMHG | OXYGEN SATURATION: 97 %

## 2024-08-21 DIAGNOSIS — H04.209 SNEEZING WITH WATERY EYES: ICD-10-CM

## 2024-08-21 DIAGNOSIS — M25.831 MASS OF JOINT OF RIGHT WRIST: ICD-10-CM

## 2024-08-21 DIAGNOSIS — J34.89 SINUS PRESSURE: Primary | ICD-10-CM

## 2024-08-21 DIAGNOSIS — I10 PRIMARY HYPERTENSION: ICD-10-CM

## 2024-08-21 DIAGNOSIS — J30.2 SEASONAL ALLERGIES: ICD-10-CM

## 2024-08-21 DIAGNOSIS — R09.81 SINUS CONGESTION: ICD-10-CM

## 2024-08-21 DIAGNOSIS — R06.7 SNEEZING WITH WATERY EYES: ICD-10-CM

## 2024-08-21 PROCEDURE — 99213 OFFICE O/P EST LOW 20 MIN: CPT | Performed by: NURSE PRACTITIONER

## 2024-08-21 PROCEDURE — 73110 X-RAY EXAM OF WRIST: CPT | Mod: RT

## 2024-08-21 RX ORDER — AMLODIPINE BESYLATE 5 MG/1
5 TABLET ORAL DAILY
Qty: 90 TABLET | Refills: 1 | Status: SHIPPED | OUTPATIENT
Start: 2024-08-21 | End: 2025-08-21

## 2024-08-21 ASSESSMENT — PAIN SCALES - GENERAL: PAINLEVEL: 9

## 2024-08-21 ASSESSMENT — ENCOUNTER SYMPTOMS
OCCASIONAL FEELINGS OF UNSTEADINESS: 1
DEPRESSION: 0
LOSS OF SENSATION IN FEET: 0

## 2024-08-21 NOTE — PATIENT INSTRUCTIONS
Seasonal allergies, nasal congestion and itchy watery eyes.     Recommend Claritan d  Increase fluids and rest.  Contact office with worsening condition or no resolve over the next 72 hours.    Hypertension-restart amlodipine as directed.  Prescription filled.  Patient to monitor her blood pressure.  Contact office with blood pressure readings greater than 150/90 or less than 90/60.    History of TIA- continue atorvatatin and baby aspirin as directed.  She is scheduled and follow-up with neurology.  She is to contact office or go to ER with recurrent symptoms.    Right medial wrist mass - xray to be obtained.  Referred to ortho for further evaluation.  She will be notified of results as they become available.    Follow-up in 1 to 2 weeks for blood pressure check or sooner as needed.

## 2024-08-21 NOTE — PROGRESS NOTES
Subjective   Patient ID: Deanne Gerbracht is a 64 y.o. female who presents for Follow-up (TIA follow up, patient is complaining of headaches and has a lump on right wrist/hand that is causing pain with numbness).  HPI64-year-old female presents today for sinus pressure, seasonal allergies and right > left sided headache for a couple days associated with watery eyes.   History of seasonal allergies.    She denies fever, chills, nausea, vomiting or diarrhea.  No cough.  Denies change in vision.  Of note she has been out of her amlodipine for over a week.  Concern raised due to elevation in blood pressure.    She noticed a lump on her right wrist which seems to be getting bigger.  She states it is now causing some numbness and tingling with certain positions to her right hand.    Due to see Neurology 10/31/2024    Presently smoking --down to 0.5 ppd -   Encouraged to quit smoking!  She states she is trying.   Declines smoking cessation program and smoking cessation aids.      Review of hospital records:  6/25/2024 tia, history of lacunar stroke, tobacco abuse, hypertension, cerebral aneurysms.  Recommended by neurology ASA and plavix 21 day then continue daily  ASA  Continue with lisinopril 40 mg and amlodipine 5 mg for hypertension management.     MR Brain 6/26/2024  MRA neck  Old lacunar infarcts within the left basal ganglia and thalamus.     There is a 0.3 x 0.2 cm superiorly directed outpouching arising from  the anterior communicating artery compatible with an aneurysm.      There is a 0.3 x 0.3 cm outpouching at the right MCA bifurcation  compatible with an aneurysm.      There is expected flow signal in bilateral intracranial internal  carotid arteries, bilateral carotid terminals, right proximal  anterior and bilateral middle cerebral arteries.      Posterior circulation: Bilateral intracranial vertebral arteries,  vertebrobasilar junction, basilar artery and proximal posterior  cerebral arteries demonstrate  "expected flow signal. The posterior  cerebral arteries are mainly supplied by robust posterior  communicating arteries.  No evidence of major vessel cutoff or significant stenosis on MRA  head and neck.  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  There is a 0.3 cm aneurysm at the right MCA bifurcation.    Review of Systems  Review of systems: Present-not feeling well. Not present-chills, fatigue and fever.  Skin: Not present- new lesions and rash.  HEENT: Sinus headache, and seasonal allergies.  Not present- diplopia, visual loss, ear pain, tinnitus, vertigo and sore throat.  Neck: Not present-neck pain, neck stiffness and swollen glands.  Respiratory: Not present-difficulty breathing, cough, bloody sputum.  Cardiovascular: Not present-abnormal blood pressure, chest pain, edema, palpitations, dyspnea on exertion.  Gastrointestinal: Not present-abdominal pain, bloody or very black stools, changes in bowel habits, heartburn, jaundice, nausea and vomiting.  Genitourinary: Not present-change in bladder habits, hematuria, flank pain and dysuria.  Musculoskeletal: right wrist lump.  See HPI.  No back pain, joint redness or warmth.  Neurological: Not present-dizziness, trouble walking, unsteadiness, vertigo, weakness.  Hematology:  Not present-anemia, excessive bleeding, bruising and epistaxis.    Objective   BP (!) 160/95   Pulse 73   Temp 36.8 °C (98.3 °F) (Temporal)   Resp 16   Ht 1.702 m (5' 7\")   Wt 65 kg (143 lb 6 oz)   SpO2 97%   BMI 22.46 kg/m²    Has been out of amlodipine for a week.    Repeat 149/77       Physical Exam  Gen.: Mental status-alert. Gen. appearance-cooperative, well groomed and consistent with stated age. Not in acute distress or sickly. Orientation-oriented to time, place, purpose and person. Build and nutrition-well-nourished and well-developed. Hydration-well-hydrated.    Integumentary: Color-normal coloration skin. Skin moisture-normal skin " moisture.    Head and neck: Head-normocephalic, atraumatic with no lesions or palpable masses. Face-atraumatic. Neck-full range of motion and subtle. No lymphadenopathy and no nuchal rigidity.     ENMT: Ears-no tenderness noted to the external auditory canal-bilaterally. Otoscopic exam: Tympanic membranes-left-tympanic membrane is gray in appearance. Right-tympanic membrane is gray in appearance. Nose -frontal sinuses-tender right > left and no purulent drainage. Maxillary sinuses-non-tender and no purulent drainage. Mouth: Oral mucosa-pink and moist. Oropharynx: No airway distress, bulging of the pharyngeal wall, edema of the uvula.  Mild pharyngeal erythema.    Chest and lung exam: Auscultation-normal breath sounds, no adventitious lung sounds and normal vocal resonance. Chest wall is normal in shape and non-tender.    Cardiovascular: Auscultation: Regular rate and rhythm. Heart sounds-normal heart sounds, S1-S2. No murmurs or gallops appreciated. Carotid arteries normal and without bruit.    Abdomen: Auscultation-auscultation of the abdomen reveals normal bowel sounds throughout.     Peripheral vascular: Lower extremity-inspection is normal bilaterally. Normal temperature and no edema bilaterally.    Neurologic: Mental tcuirj-hdmqso-fpvzhbmwsjs. Cranial nerves: Cranial nerves II through XII grossly intact. Normal gait.    Musculoskeletal: Examination of the spine with no step-offs or point tenderness.  Full range of motion of the spine without pain or difficulty.   Lump to right wrist - firm nodule.  No associated redness or warmth.   Patient denies trauma to the area.    Full range of motion of wrist, fingers.   5/5 motor and sensory.   Right lower extremity-normal strength and tone, normal range of motion without pain. Left lower extremity-normal strength and tone, normal range of motion without pain.  Assessment/Plan   Diagnoses and all orders for this visit:  Sinus pressure  Primary hypertension  -      amLODIPine (Norvasc) 5 mg tablet; Take 1 tablet (5 mg) by mouth once daily.  Mass of joint of right wrist  -     XR wrist right 3+ views; Future  -     Referral to Orthopaedic Surgery; Future  Seasonal allergies  Sinus congestion  Sneezing with watery eyes

## 2024-08-27 ENCOUNTER — TELEPHONE (OUTPATIENT)
Dept: PRIMARY CARE | Facility: CLINIC | Age: 64
End: 2024-08-27
Payer: COMMERCIAL

## 2024-08-27 DIAGNOSIS — I63.9 ACUTE CVA (CEREBROVASCULAR ACCIDENT) (MULTI): ICD-10-CM

## 2024-08-27 RX ORDER — ATORVASTATIN CALCIUM 40 MG/1
40 TABLET, FILM COATED ORAL NIGHTLY
Qty: 90 TABLET | Refills: 2 | Status: SHIPPED | OUTPATIENT
Start: 2024-08-27 | End: 2025-08-27

## 2024-08-28 ENCOUNTER — OFFICE VISIT (OUTPATIENT)
Dept: ORTHOPEDIC SURGERY | Facility: CLINIC | Age: 64
End: 2024-08-28
Payer: COMMERCIAL

## 2024-08-28 DIAGNOSIS — G56.01 CARPAL TUNNEL SYNDROME ON RIGHT: Primary | ICD-10-CM

## 2024-08-28 DIAGNOSIS — M25.831 MASS OF JOINT OF RIGHT WRIST: ICD-10-CM

## 2024-08-28 PROCEDURE — 99203 OFFICE O/P NEW LOW 30 MIN: CPT | Performed by: ORTHOPAEDIC SURGERY

## 2024-08-28 PROCEDURE — 99213 OFFICE O/P EST LOW 20 MIN: CPT | Performed by: ORTHOPAEDIC SURGERY

## 2024-08-28 NOTE — PROGRESS NOTES
Subjective    Patient ID: Deanne Gerbracht is a 64 y.o. female.    Chief Complaint: Pain of the Right Wrist     Last Surgery: No surgery found  Last Surgery Date: No surgery found    HPI  Patient is a 64-year-old right-hand-dominant female who comes in with a complaint of right hand numbness and pain.  She is status post a right carpal tunnel release as well as a right median nerve decompression in the forearm in 2022 by different surgeon.  She states she feels only 30% improved since that surgery.  She complains of numbness and pain specifically in her thumb and index finger.    Objective   Ortho Exam  Patient is in no acute distress.  Exam of her right hand and upper extremity reveals she has 2 well-healed incisions from her previous procedures.  She has no thenar intrinsic atrophy.  However she has a positive Tinel's at the carpal tunnel.  She has a negative Tinel's at the cubital tunnel.  She has a positive Phalen test as well as a positive carpal tunnel compression test.  Near the base of her right fifth metacarpal is a ganglion that is about 1.1 cm in diameter.  It is freely mobile.  It is nontender.    Image Results:  XR wrist right 3+ views  Narrative: Interpreted By:  Leslie Bernardo,   STUDY:  Right wrist,  3 views.      INDICATION:  Signs/Symptoms:mass to medial right wrist.      COMPARISON:  None.      ACCESSION NUMBER(S):  [id number]      ORDERING CLINICIAN:  RUI URIARTE      FINDINGS:  No acute fracture or malalignment.  No significant degenerative changes.      Lobulated soft tissue prominence noted in the ulnar aspect of the  wrist.      Impression: 1. Lobulated soft tissue prominence noted in the ulnar aspect of the  wrist which may represent ganglion cyst. Consider ultrasound or MRI  for definitive characterization if clinically warranted.      MACRO:  None.      Signed by: Leslie Bernarod 8/24/2024 4:39 PM  Dictation workstation:   [id number]      Assessment/Plan   Encounter  Diagnoses:  Carpal tunnel syndrome on right    Mass of joint of right wrist    Orders Placed This Encounter    Point of Care Neuromuscular US     Patient has right hand numbness that may be secondary to recurrent right carpal tunnel syndrome.  I explained to the patient that the location of her cyst would not cause neuropathy in her index and thumb.  I will have the patient undergo a right upper extremity neuromuscular ultrasound.  The patient will follow-up after the ultrasound is done.

## 2024-10-14 ENCOUNTER — APPOINTMENT (OUTPATIENT)
Dept: CARDIOLOGY | Facility: CLINIC | Age: 64
End: 2024-10-14
Payer: COMMERCIAL

## 2024-10-31 ENCOUNTER — APPOINTMENT (OUTPATIENT)
Dept: NEUROLOGY | Facility: CLINIC | Age: 64
End: 2024-10-31
Payer: COMMERCIAL

## 2024-10-31 ENCOUNTER — LAB (OUTPATIENT)
Dept: LAB | Facility: LAB | Age: 64
End: 2024-10-31
Payer: COMMERCIAL

## 2024-10-31 VITALS
HEART RATE: 67 BPM | WEIGHT: 146.2 LBS | RESPIRATION RATE: 16 BRPM | TEMPERATURE: 97.3 F | SYSTOLIC BLOOD PRESSURE: 164 MMHG | HEIGHT: 67 IN | DIASTOLIC BLOOD PRESSURE: 100 MMHG | BODY MASS INDEX: 22.95 KG/M2

## 2024-10-31 DIAGNOSIS — I67.1 CEREBRAL ANEURYSM (HHS-HCC): ICD-10-CM

## 2024-10-31 DIAGNOSIS — R26.1 SPASTIC GAIT: ICD-10-CM

## 2024-10-31 DIAGNOSIS — F41.9 ANXIETY: ICD-10-CM

## 2024-10-31 DIAGNOSIS — G44.209 ACUTE NON INTRACTABLE TENSION-TYPE HEADACHE: ICD-10-CM

## 2024-10-31 DIAGNOSIS — I10 PRIMARY HYPERTENSION: ICD-10-CM

## 2024-10-31 DIAGNOSIS — G45.9 TIA (TRANSIENT ISCHEMIC ATTACK): Primary | ICD-10-CM

## 2024-10-31 DIAGNOSIS — E78.5 HYPERLIPIDEMIA, UNSPECIFIED HYPERLIPIDEMIA TYPE: ICD-10-CM

## 2024-10-31 DIAGNOSIS — R26.81 UNSTEADY GAIT: ICD-10-CM

## 2024-10-31 LAB
CHOLEST SERPL-MCNC: 162 MG/DL (ref 0–199)
CHOLESTEROL/HDL RATIO: 3.6
ERYTHROCYTE [SEDIMENTATION RATE] IN BLOOD BY WESTERGREN METHOD: 24 MM/H (ref 0–30)
HDLC SERPL-MCNC: 44.6 MG/DL
LDLC SERPL CALC-MCNC: 98 MG/DL
NON HDL CHOLESTEROL: 117 MG/DL (ref 0–149)
TRIGL SERPL-MCNC: 99 MG/DL (ref 0–149)
VLDL: 20 MG/DL (ref 0–40)

## 2024-10-31 PROCEDURE — 99215 OFFICE O/P EST HI 40 MIN: CPT | Performed by: PSYCHIATRY & NEUROLOGY

## 2024-10-31 PROCEDURE — 3080F DIAST BP >= 90 MM HG: CPT | Performed by: PSYCHIATRY & NEUROLOGY

## 2024-10-31 PROCEDURE — 36415 COLL VENOUS BLD VENIPUNCTURE: CPT

## 2024-10-31 PROCEDURE — 3008F BODY MASS INDEX DOCD: CPT | Performed by: PSYCHIATRY & NEUROLOGY

## 2024-10-31 PROCEDURE — 3077F SYST BP >= 140 MM HG: CPT | Performed by: PSYCHIATRY & NEUROLOGY

## 2024-10-31 PROCEDURE — 85652 RBC SED RATE AUTOMATED: CPT

## 2024-10-31 PROCEDURE — 80061 LIPID PANEL: CPT

## 2024-10-31 RX ORDER — ASPIRIN 81 MG/1
81 TABLET ORAL DAILY
COMMUNITY

## 2024-10-31 RX ORDER — METHYLPREDNISOLONE 4 MG/1
TABLET ORAL
Qty: 21 TABLET | Refills: 0 | Status: SHIPPED | OUTPATIENT
Start: 2024-10-31 | End: 2024-11-07

## 2024-10-31 ASSESSMENT — ENCOUNTER SYMPTOMS
OCCASIONAL FEELINGS OF UNSTEADINESS: 1
HEADACHES: 1
LOSS OF SENSATION IN FEET: 0
DEPRESSION: 0

## 2024-11-12 NOTE — PROGRESS NOTES
NPV - Referred by Dr. Schwartz after being seen at [facility] on 6/26/24 where she noted some vision changes in her right eye 2 weeks prior to coming to the ER. She was alos noted to have deviation of her right eye, right facial droop, right facial numbness, right-sided weakness and difficulty walking at that time. CT noted new lacunar infarction in the left occipital white matter. Upon further workup and MRA she was noted to have a 0.3 cm aneurysm arising from the anterior communicating artery and a 0.3 cm aneurysm at the right MCA bifurcation.     Deanne Gerbracht is a 64 y.o. year old female    HPI  Ms. Gerbracht is a 65 yo lady who on workup for right-sided weakness found to have a 3 mm ACOM aneurysm and a 3 mm right MCA aneurysm.  She is referred for further evaluation.    Currently in the process of quitting smoking  No family history of brain aneurysm  Hypertension but controlled per patient  No collagen vascular diseases.    Review of Systems       Past Medical History:   Diagnosis Date    Carpal tunnel syndrome of right wrist     Hypertension     Hypertensive urgency 04/18/2019    Pneumonia     Stroke (Multi)        Past Surgical History:   Procedure Laterality Date    CARPAL TUNNEL RELEASE Right     OVARIAN CYST REMOVAL             Current Outpatient Medications:     amLODIPine (Norvasc) 5 mg tablet, Take 1 tablet (5 mg) by mouth once daily., Disp: 90 tablet, Rfl: 1    aspirin 81 mg EC tablet, Take 1 tablet (81 mg) by mouth once daily., Disp: , Rfl:     atorvastatin (Lipitor) 40 mg tablet, Take 1 tablet (40 mg) by mouth once daily at bedtime., Disp: 90 tablet, Rfl: 2    lisinopril 40 mg tablet, Take 1 tablet (40 mg) by mouth once daily., Disp: 90 tablet, Rfl: 3    meclizine (Antivert) 12.5 mg tablet, Take 1 tablet (12.5 mg) by mouth 3 times a day as needed for dizziness., Disp: 30 tablet, Rfl: 3        Objective   Vitals:    11/14/24 1015   BP: 121/79   Pulse: 73   Resp: 17       Neurological  Exam    AAO x 3  PERRL, EOMI, TS, TML  5/5  Senorsy intact  No drift    [unfilled]    MR angio head wo IV contrast  MR angio neck wo IV contrast  Status: Final result     PACS Images     Show images for MR angio head wo IV contrast  Signed by    Signed Time Phone Pager   Shara Zhao MD 6/26/2024 09:49 [phone]     Exam Information    Status Exam Begun Exam Ended   Final 6/26/2024 08:23 6/26/2024 08:55     Study Result    Narrative & Impression   Interpreted By:  Shara Zhao  and Titus Salgado   STUDY:  MR ANGIO NECK WO IV CONTRAST; MR ANGIO HEAD WO IV CONTRAST;  6/26/2024 8:55 am      INDICATION:  Signs/Symptoms:stroke workup.  Stroke protocol.      COMPARISON:  MRI brain 06/06/2024, CT head 06/25/2024      ACCESSION NUMBER(S):  [id number]; [id number]      ORDERING CLINICIAN:  ROMULO CLARK      TECHNIQUE:  Time-of-flight MRA of the head  and neck was performed. The images  were reviewed as source images and maximum intensity projections.      FINDINGS:  MRA of head:      Anterior circulation:      There is a 0.3 x 0.2 cm superiorly directed outpouching arising from  the anterior communicating artery compatible with an aneurysm.      There is a 0.3 x 0.3 cm outpouching at the right MCA bifurcation  compatible with an aneurysm.      There is expected flow signal in bilateral intracranial internal  carotid arteries, bilateral carotid terminals, right proximal  anterior and bilateral middle cerebral arteries.      Posterior circulation: Bilateral intracranial vertebral arteries,  vertebrobasilar junction, basilar artery and proximal posterior  cerebral arteries demonstrate expected flow signal. The posterior  cerebral arteries are mainly supplied by robust posterior  communicating arteries.      MRA of neck:      The source images are mildly degraded by artifact.      Right carotid vessels:  There is expected flow signal in the  visualized portion of the common carotid artery.  There is  mild  attenuation of flow signal at the carotid bifurcation which may be  secondary to flow related artifact. The internal carotid artery in  the neck demonstrates expected flow signal.      Left carotid vessels:   There is expected flow signal in the  visualized portion of the common carotid artery.  There is mild  attenuation of flow signal at the carotid bifurcation which may be  secondary to flow related artifact. The internal carotid artery in  the neck demonstrates expected flow signal.      Vertebral vessels:   The visualized segments of the cervical  vertebral arteries demonstrate expected flow signal.      IMPRESSION:  MRA:      No evidence of major vessel cutoff or significant stenosis on MRA  head and neck.      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      There is a 0.3 cm aneurysm at the right MCA bifurcation.      I personally reviewed the images/study and I agree with the findings  as stated by Damian Qureshi MD. This study was interpreted at  [facility], [place], [place].      MACRO:  None      Signed by: Shara Zhao 6/26/2024 9:49 AM  Dictation workstation:   [id number]       Assessment/Plan       I had the pleasure of seeing Ms. Gerbracht and had a thorough discussion as well as reviewed her MRI together.  She has an incidental 3 mm ACOM and a 3 mm right MCA aneurysm.    We discussed the natural history of cerebral aneurysm.  Based on all risks factors, the annual risk of cerebral aneurysm rupture is approximately < 0.5%.  We discussed the treatment options, which included observation with serial imaging, microsurgery, and endovascular treatment.  We also discussed the statistics associated with a rupture aneurysm.  The patient understands the signs and symptoms of a ruptured aneurysm.  In the event of a ruptured aneurysm, patient and their family understand they need to go to the nearest ER (airway protection).       Based on factors, I would recommend observation with repeat MRA in June 2025 since her MRA was performed in June 2024.  Patient also agrees to observe as well.  All question were answered to her satisfaction.

## 2024-11-14 ENCOUNTER — OFFICE VISIT (OUTPATIENT)
Dept: NEUROSURGERY | Facility: HOSPITAL | Age: 64
End: 2024-11-14
Payer: COMMERCIAL

## 2024-11-14 VITALS
RESPIRATION RATE: 17 BRPM | HEIGHT: 67 IN | DIASTOLIC BLOOD PRESSURE: 79 MMHG | HEART RATE: 73 BPM | BODY MASS INDEX: 22.91 KG/M2 | WEIGHT: 146 LBS | SYSTOLIC BLOOD PRESSURE: 121 MMHG

## 2024-11-14 DIAGNOSIS — I67.1 CEREBRAL ANEURYSM (HHS-HCC): ICD-10-CM

## 2024-11-14 PROCEDURE — 99202 OFFICE O/P NEW SF 15 MIN: CPT | Performed by: NEUROLOGICAL SURGERY

## 2024-11-14 PROCEDURE — 3008F BODY MASS INDEX DOCD: CPT | Performed by: NEUROLOGICAL SURGERY

## 2024-11-14 PROCEDURE — 99212 OFFICE O/P EST SF 10 MIN: CPT | Performed by: NEUROLOGICAL SURGERY

## 2024-11-14 PROCEDURE — 3078F DIAST BP <80 MM HG: CPT | Performed by: NEUROLOGICAL SURGERY

## 2024-11-14 PROCEDURE — 3074F SYST BP LT 130 MM HG: CPT | Performed by: NEUROLOGICAL SURGERY

## 2024-11-14 ASSESSMENT — ENCOUNTER SYMPTOMS
LOSS OF SENSATION IN FEET: 0
OCCASIONAL FEELINGS OF UNSTEADINESS: 0
DEPRESSION: 0

## 2024-11-14 ASSESSMENT — PATIENT HEALTH QUESTIONNAIRE - PHQ9
1. LITTLE INTEREST OR PLEASURE IN DOING THINGS: NOT AT ALL
2. FEELING DOWN, DEPRESSED OR HOPELESS: NOT AT ALL
SUM OF ALL RESPONSES TO PHQ9 QUESTIONS 1 AND 2: 0

## 2024-11-17 ASSESSMENT — PROMIS GLOBAL HEALTH SCALE
RATE_QUALITY_OF_LIFE: VERY GOOD
RATE_SOCIAL_SATISFACTION: VERY GOOD
CARRYOUT_SOCIAL_ACTIVITIES: GOOD
EMOTIONAL_PROBLEMS: SOMETIMES
RATE_AVERAGE_PAIN: 8
RATE_AVERAGE_FATIGUE: MILD
RATE_PHYSICAL_HEALTH: GOOD
RATE_GENERAL_HEALTH: GOOD
RATE_MENTAL_HEALTH: VERY GOOD
CARRYOUT_PHYSICAL_ACTIVITIES: MODERATELY

## 2024-11-18 ENCOUNTER — APPOINTMENT (OUTPATIENT)
Dept: CARDIOLOGY | Facility: CLINIC | Age: 64
End: 2024-11-18
Payer: COMMERCIAL

## 2024-11-21 ENCOUNTER — LAB (OUTPATIENT)
Dept: LAB | Facility: LAB | Age: 64
End: 2024-11-21
Payer: COMMERCIAL

## 2024-11-21 ENCOUNTER — OFFICE VISIT (OUTPATIENT)
Dept: PRIMARY CARE | Facility: CLINIC | Age: 64
End: 2024-11-21
Payer: COMMERCIAL

## 2024-11-21 VITALS
SYSTOLIC BLOOD PRESSURE: 128 MMHG | TEMPERATURE: 97.9 F | RESPIRATION RATE: 16 BRPM | WEIGHT: 146 LBS | OXYGEN SATURATION: 98 % | HEART RATE: 64 BPM | HEIGHT: 67 IN | BODY MASS INDEX: 22.91 KG/M2 | DIASTOLIC BLOOD PRESSURE: 81 MMHG

## 2024-11-21 DIAGNOSIS — J06.9 UPPER RESPIRATORY INFECTION WITH COUGH AND CONGESTION: ICD-10-CM

## 2024-11-21 DIAGNOSIS — Z78.0 POST-MENOPAUSE: ICD-10-CM

## 2024-11-21 DIAGNOSIS — Z00.00 ANNUAL PHYSICAL EXAM: Primary | ICD-10-CM

## 2024-11-21 DIAGNOSIS — Z12.11 SCREENING FOR COLON CANCER: ICD-10-CM

## 2024-11-21 DIAGNOSIS — F17.210 SMOKES CIGARETTES: ICD-10-CM

## 2024-11-21 DIAGNOSIS — Z12.31 SCREENING MAMMOGRAM FOR BREAST CANCER: ICD-10-CM

## 2024-11-21 DIAGNOSIS — Z13.89 SCREENING FOR BLOOD OR PROTEIN IN URINE: ICD-10-CM

## 2024-11-21 DIAGNOSIS — R91.8 LUNG NODULES: ICD-10-CM

## 2024-11-21 LAB
ANION GAP SERPL CALC-SCNC: 10 MMOL/L (ref 10–20)
BUN SERPL-MCNC: 16 MG/DL (ref 6–23)
CALCIUM SERPL-MCNC: 9.5 MG/DL (ref 8.6–10.3)
CHLORIDE SERPL-SCNC: 106 MMOL/L (ref 98–107)
CO2 SERPL-SCNC: 29 MMOL/L (ref 21–32)
CREAT SERPL-MCNC: 0.62 MG/DL (ref 0.5–1.05)
EGFRCR SERPLBLD CKD-EPI 2021: >90 ML/MIN/1.73M*2
ERYTHROCYTE [DISTWIDTH] IN BLOOD BY AUTOMATED COUNT: 12.5 % (ref 11.5–14.5)
FLUAV RNA RESP QL NAA+PROBE: NOT DETECTED
FLUBV RNA RESP QL NAA+PROBE: NOT DETECTED
GLUCOSE SERPL-MCNC: 90 MG/DL (ref 74–99)
HCT VFR BLD AUTO: 40.1 % (ref 36–46)
HGB BLD-MCNC: 13 G/DL (ref 12–16)
MCH RBC QN AUTO: 32.2 PG (ref 26–34)
MCHC RBC AUTO-ENTMCNC: 32.4 G/DL (ref 32–36)
MCV RBC AUTO: 99 FL (ref 80–100)
NRBC BLD-RTO: 0 /100 WBCS (ref 0–0)
PLATELET # BLD AUTO: 265 X10*3/UL (ref 150–450)
POC APPEARANCE, URINE: CLEAR
POC BILIRUBIN, URINE: NEGATIVE
POC BLOOD, URINE: NEGATIVE
POC COLOR, URINE: YELLOW
POC GLUCOSE, URINE: NEGATIVE MG/DL
POC KETONES, URINE: NEGATIVE MG/DL
POC LEUKOCYTES, URINE: NEGATIVE
POC NITRITE,URINE: NEGATIVE
POC PH, URINE: 7 PH
POC PROTEIN, URINE: NEGATIVE MG/DL
POC SPECIFIC GRAVITY, URINE: 1.01
POC UROBILINOGEN, URINE: 1 EU/DL
POTASSIUM SERPL-SCNC: 4 MMOL/L (ref 3.5–5.3)
RBC # BLD AUTO: 4.04 X10*6/UL (ref 4–5.2)
RSV RNA RESP QL NAA+PROBE: NOT DETECTED
SODIUM SERPL-SCNC: 141 MMOL/L (ref 136–145)
WBC # BLD AUTO: 6.9 X10*3/UL (ref 4.4–11.3)

## 2024-11-21 PROCEDURE — 80048 BASIC METABOLIC PNL TOTAL CA: CPT

## 2024-11-21 PROCEDURE — 36415 COLL VENOUS BLD VENIPUNCTURE: CPT

## 2024-11-21 PROCEDURE — 81003 URINALYSIS AUTO W/O SCOPE: CPT | Mod: QW | Performed by: NURSE PRACTITIONER

## 2024-11-21 PROCEDURE — 87634 RSV DNA/RNA AMP PROBE: CPT | Mod: PARLAB | Performed by: NURSE PRACTITIONER

## 2024-11-21 PROCEDURE — 85027 COMPLETE CBC AUTOMATED: CPT

## 2024-11-21 PROCEDURE — 99214 OFFICE O/P EST MOD 30 MIN: CPT | Performed by: NURSE PRACTITIONER

## 2024-11-21 PROCEDURE — 87631 RESP VIRUS 3-5 TARGETS: CPT | Mod: PARLAB | Performed by: NURSE PRACTITIONER

## 2024-11-21 RX ORDER — DOXYCYCLINE 100 MG/1
100 CAPSULE ORAL 2 TIMES DAILY
Qty: 14 CAPSULE | Refills: 0 | Status: SHIPPED | OUTPATIENT
Start: 2024-11-21 | End: 2024-11-28

## 2024-11-21 ASSESSMENT — LIFESTYLE VARIABLES
SKIP TO QUESTIONS 9-10: 1
HOW OFTEN DO YOU HAVE SIX OR MORE DRINKS ON ONE OCCASION: NEVER
HOW OFTEN DO YOU HAVE A DRINK CONTAINING ALCOHOL: NEVER
AUDIT-C TOTAL SCORE: 0
HOW MANY STANDARD DRINKS CONTAINING ALCOHOL DO YOU HAVE ON A TYPICAL DAY: PATIENT DOES NOT DRINK

## 2024-11-21 ASSESSMENT — ENCOUNTER SYMPTOMS
DEPRESSION: 0
LOSS OF SENSATION IN FEET: 1
OCCASIONAL FEELINGS OF UNSTEADINESS: 1

## 2024-11-21 ASSESSMENT — PAIN SCALES - GENERAL: PAINLEVEL_OUTOF10: 6

## 2024-11-21 NOTE — PROGRESS NOTES
Subjective   Patient ID: Deanne Gerbracht is a 64 y.o. female who presents for Annual Exam (Patient presents for annual exam).  HPI64 y.o. female with past medical history of hypertension, hyperlipidemia, palpitations, GERD, Anxiety, h/o cva and occipital stroke, smoker presents today for annual exam.      Last eye exam - wears glasses --Luda  Last dental - no teeth  Last mammogram -due   Last pap test -3-4 years ago -  patient to schedule   Declines colonoscopy ---last cologard ok     05 ppd - 6 months   1ppd x 30+ years   Declines smoking cessation programs and aids at this time.    Cardiologist - Lesley   Neurology - Efren   Ortho - [place]     Cough x 2 weeks     Review of Systems  Review of systems: Present-feeling well. Not present-chills, fatigue and fever.  Skin: Not present-new lesions and rash.  HEENT: Not present-headache, ear pain, nasal congestion, and sore throat.  Neck: Not present-neck pain, neck stiffness and swollen glands.  Respiratory: Cough.  Not present-difficulty breathing, bloody sputum.  Cardiovascular: Not present-abnormal blood pressure, chest pain, edema, palpitations, dyspnea on exertion.  Gastrointestinal: Not present-abdominal pain, bloody or very black stools, changes in bowel habits, heartburn, jaundice, nausea and vomiting.  Genitourinary: Not present-change in bladder habits, hematuria, flank pain and dysuria.  Musculoskeletal: Not present- joint pain, joint swelling, joint redness.  Neurological: Not present-dizziness, headache, numbness or tingling.  Psychiatric: Not present-insomnia, depression, trouble falling asleep, panic attacks, suicidal ideation, suicidal planning, thoughts of hurting others and thoughts of self-harm.  Endocrine: Not present-appetite changes, polydipsia, polyuria, and thyroid problems.  Hematology:  Not present-anemia, excessive bleeding, bruising and epistaxis.    Objective   /81   Pulse 64   Temp 36.6 °C (97.9 °F)   Resp 16   Ht 1.702 m  "(5' 7\")   Wt 66.2 kg (146 lb)   SpO2 98%   BMI 22.87 kg/m²      Physical Exam  Gen.: Mental status-alert. Gen. appearance-cooperative, well groomed and consistent with stated age. Not in acute distress or sickly. Orientation-oriented to time, place, purpose and person. Build and nutrition-well-nourished and well-developed. Hydration-well-hydrated.    Integumentary: Color-normal coloration skin. Skin moisture-normal skin moisture.    Head and neck: Head-normocephalic, atraumatic with no lesions or palpable masses. Face-atraumatic. Neck-full range of motion and subtle. No lymphadenopathy and no nuchal rigidity. Thyroid-normal size and consistency with no palpable lumps.    ENMT: Ears-no tenderness noted to the external auditory canal-bilaterally. Otoscopic exam: Tympanic membranes-left-tympanic membrane is gray in appearance. Right-tympanic membrane is gray in appearance. Nose -frontal sinuses-non-tender and no purulent drainage. Maxillary sinuses-non-tender and no purulent drainage. Mouth: Oral mucosa-pink and moist. Oropharynx: No airway distress, bulging of the pharyngeal wall, edema of the uvula, and no pharyngeal erythema.    Chest and lung exam: Auscultation-normal breath sounds, no adventitious lung sounds and normal vocal resonance. Chest wall is normal in shape and non-tender.    Cardiovascular: Auscultation: Regular rate and rhythm. Heart sounds-normal heart sounds, S1-S2. No murmurs or gallops appreciated. Carotid arteries normal and without bruit.    Abdomen: Non-tender, no rigidity, and no palpable masses. There is no hepatosplenomegaly. Auscultation-auscultation of the abdomen reveals normal bowel sounds throughout.     Peripheral vascular: Normal temperature and no edema bilaterally.    Neurologic: Mental bpurjg-yjomfm-dodrouonned. Cranial nerves: Cranial nerves II through XII grossly intact. Normal gait.    Musculoskeletal: Examination of the spine with no step-offs or point tenderness.  Full range " of motion of the spine without pain or difficulty. Right lower extremity-normal strength and tone, normal range of motion without pain. Left lower extremity-normal strength and tone, normal range of motion without pain.  Assessment/Plan   Diagnoses and all orders for this visit:  Annual physical exam  Screening mammogram for breast cancer  -     BI mammo bilateral screening tomosynthesis; Future  Screening for colon cancer  -     Cologuard® colon cancer screening; Future  Post-menopause  -     XR DEXA bone density; Future  Lung nodules  -     CT lung screening low dose; Future  Smokes cigarettes  -     CT lung screening low dose; Future  Upper respiratory infection with cough and congestion  -     Sars-CoV-2 PCR  -     Influenza A, and B PCR  -     RSV PCR  -     CBC; Future  -     Basic metabolic panel; Future  -     XR chest 2 views; Future  -     doxycycline (Vibramycin) 100 mg capsule; Take 1 capsule (100 mg) by mouth 2 times a day for 7 days. Take with at least 8 ounces (large glass) of water, do not lie down for 30 minutes after  Screening for blood or protein in urine  -     POCT UA Automated manually resulted

## 2024-11-21 NOTE — PATIENT INSTRUCTIONS
AE with fasting labs to be obtained.   UA unremarkable.    Mammogram requisition given.   Bone density to be obtained.  Encouraged vit d and calcium supplements.   Weight bearing exercises.   Cologard requisition given.     Beth strongly encouraged to quit smoking.   CT Chest low dose screen to be obtained.  Declines smoking cessation program and aids at this time.      Stroke - currently on atorvastatin 40 mg and ASA 81 mg  Hypertension - controlled on current medical regime.       Cough x 2 weeks - chest x ray to be obtained.    Continue to increase fluids and rest.   Covid, flu and rsv obtained.   She will be notified of all results as they become available.   She was given a prescription for Doxycycline as directed.       Follow up in 6 months or sooner as needed.

## 2024-11-22 ENCOUNTER — HOSPITAL ENCOUNTER (OUTPATIENT)
Dept: RADIOLOGY | Facility: HOSPITAL | Age: 64
Discharge: HOME | End: 2024-11-22
Payer: COMMERCIAL

## 2024-11-22 DIAGNOSIS — J06.9 UPPER RESPIRATORY INFECTION WITH COUGH AND CONGESTION: ICD-10-CM

## 2024-11-22 DIAGNOSIS — F17.210 SMOKES CIGARETTES: ICD-10-CM

## 2024-11-22 LAB — SARS-COV-2 RNA RESP QL NAA+PROBE: NOT DETECTED

## 2024-11-22 PROCEDURE — 71046 X-RAY EXAM CHEST 2 VIEWS: CPT

## 2024-11-29 ENCOUNTER — APPOINTMENT (OUTPATIENT)
Dept: RADIOLOGY | Facility: CLINIC | Age: 64
End: 2024-11-29
Payer: COMMERCIAL

## 2024-12-03 ENCOUNTER — APPOINTMENT (OUTPATIENT)
Dept: CARDIOLOGY | Facility: CLINIC | Age: 64
End: 2024-12-03
Payer: COMMERCIAL

## 2024-12-13 LAB — NONINV COLON CA DNA+OCC BLD SCRN STL QL: NORMAL

## 2024-12-18 ENCOUNTER — APPOINTMENT (OUTPATIENT)
Dept: CARDIOLOGY | Facility: CLINIC | Age: 64
End: 2024-12-18
Payer: COMMERCIAL

## 2024-12-20 ENCOUNTER — APPOINTMENT (OUTPATIENT)
Dept: RADIOLOGY | Facility: HOSPITAL | Age: 64
End: 2024-12-20
Payer: COMMERCIAL

## 2025-01-21 ENCOUNTER — TELEPHONE (OUTPATIENT)
Dept: OBSTETRICS AND GYNECOLOGY | Facility: CLINIC | Age: 65
End: 2025-01-21
Payer: COMMERCIAL

## 2025-01-22 ENCOUNTER — APPOINTMENT (OUTPATIENT)
Dept: CARDIOLOGY | Facility: CLINIC | Age: 65
End: 2025-01-22
Payer: COMMERCIAL

## 2025-01-22 ENCOUNTER — TELEPHONE (OUTPATIENT)
Dept: PRIMARY CARE | Facility: CLINIC | Age: 65
End: 2025-01-22
Payer: COMMERCIAL

## 2025-01-22 ENCOUNTER — APPOINTMENT (OUTPATIENT)
Dept: PRIMARY CARE | Facility: CLINIC | Age: 65
End: 2025-01-22
Payer: COMMERCIAL

## 2025-01-22 NOTE — TELEPHONE ENCOUNTER
Left voicemail message informing patient her appointment is cancelled due to provider being out of the office.

## 2025-01-23 ENCOUNTER — TELEPHONE (OUTPATIENT)
Dept: PRIMARY CARE | Facility: CLINIC | Age: 65
End: 2025-01-23
Payer: COMMERCIAL

## 2025-01-23 ENCOUNTER — APPOINTMENT (OUTPATIENT)
Dept: PRIMARY CARE | Facility: CLINIC | Age: 65
End: 2025-01-23
Payer: COMMERCIAL

## 2025-01-23 NOTE — TELEPHONE ENCOUNTER
Patient was LVM regarding appointment being  canceled related to provider being sick and out of office. Patient will call back to reschedule.

## 2025-02-05 ENCOUNTER — OFFICE VISIT (OUTPATIENT)
Dept: CARDIOLOGY | Facility: CLINIC | Age: 65
End: 2025-02-05
Payer: COMMERCIAL

## 2025-02-05 VITALS
DIASTOLIC BLOOD PRESSURE: 80 MMHG | BODY MASS INDEX: 23.18 KG/M2 | HEART RATE: 67 BPM | WEIGHT: 148 LBS | SYSTOLIC BLOOD PRESSURE: 132 MMHG | RESPIRATION RATE: 16 BRPM | OXYGEN SATURATION: 95 %

## 2025-02-05 DIAGNOSIS — I10 PRIMARY HYPERTENSION: ICD-10-CM

## 2025-02-05 DIAGNOSIS — E78.5 HYPERLIPIDEMIA, UNSPECIFIED HYPERLIPIDEMIA TYPE: ICD-10-CM

## 2025-02-05 DIAGNOSIS — I47.10 PAROXYSMAL SUPRAVENTRICULAR TACHYCARDIA (CMS-HCC): Primary | ICD-10-CM

## 2025-02-05 DIAGNOSIS — F17.200 TOBACCO DEPENDENCE: ICD-10-CM

## 2025-02-05 PROCEDURE — 3075F SYST BP GE 130 - 139MM HG: CPT | Performed by: STUDENT IN AN ORGANIZED HEALTH CARE EDUCATION/TRAINING PROGRAM

## 2025-02-05 PROCEDURE — 99213 OFFICE O/P EST LOW 20 MIN: CPT | Performed by: STUDENT IN AN ORGANIZED HEALTH CARE EDUCATION/TRAINING PROGRAM

## 2025-02-05 PROCEDURE — 3079F DIAST BP 80-89 MM HG: CPT | Performed by: STUDENT IN AN ORGANIZED HEALTH CARE EDUCATION/TRAINING PROGRAM

## 2025-02-05 ASSESSMENT — ENCOUNTER SYMPTOMS
DYSPNEA ON EXERTION: 0
PSYCHIATRIC NEGATIVE: 1
MUSCULOSKELETAL NEGATIVE: 1
NEUROLOGICAL NEGATIVE: 1
ENDOCRINE NEGATIVE: 1
HEMATOLOGIC/LYMPHATIC NEGATIVE: 1
EYES NEGATIVE: 1
PND: 0
SYNCOPE: 0
ALLERGIC/IMMUNOLOGIC NEGATIVE: 1
NEAR-SYNCOPE: 0
RESPIRATORY NEGATIVE: 1
PALPITATIONS: 0
CONSTITUTIONAL NEGATIVE: 1
GASTROINTESTINAL NEGATIVE: 1
ORTHOPNEA: 0

## 2025-02-05 NOTE — PATIENT INSTRUCTIONS
Thank you for your visit today. Please contact our office (via MyChart or phone) with any additional questions.     [facility] [place]    Martha, RN/Clinic Nurse for:    Dr. Lesley Pak    [address], [address]  [place] [address]    Phone: [phone] Press Option 5 then Option 3 to speak with the Clinic Nurse (Martha)    _____    To Reach:    Billing Questions -    [phone]  Scheduling / Rescheduling -  Option 1  Refills / Medication Requests -  Option 3  General Office / [place] -  Option 4  Results -     Option 6  Medical Records -    Option 7  Repeat Options -    Option 9

## 2025-02-05 NOTE — PROGRESS NOTES
Cardiology Established Outpatient visit    Reason for referral: follow-up for chest pains    HPI: Deanne Gerbracht is a 64 y.o.  female who presents today for chest pains. Past medical history of HTN, DLD, GERD, hx falls, tobacco dependency.      Patient was referred to cardiology clinic for chest pains.  Beth presented to cardiology clinic on 3/7/2024.  Per patient she fell on Halloween; unclear cause- patient unclear on details; fell and broke nose. Patient noted intermittent chest pains that are sub-sternal; last 2-3 minutes; occasional accompanied by palpitations.  Cardiac risk factors include HTN, DLD, tobacco dependency, and family history of ASHD (father- CABG 60s). Patient smokes ~ 1/2 PPD, trying to quit. Given intermittent chest pains and palpitations; cardiac risk factors  >  recommend further evaluation with pharmacologic nuclear medicine stress test (patient unable to exercise on treadmill) and transthoracic echocardiogram.     Beth returned to cardiology clinic on 2/5/2025.  Holter monitor showed brief runs of atrial tachycardia.  Discussed option of beta blockade if symptoms poorly controlled; patient preferred to defer for now as symptoms were not overly bothersome. Dyslipidemia much improved on atorvastatin. Prior Transthoracic echocardiogram showed normal LV systolic function; LVH. We will continue cardiac management as below.     Past Medical History:   She has a past medical history of Carpal tunnel syndrome of right wrist, Hypertension, Hypertensive urgency (04/18/2019), Pneumonia, and Stroke (Multi).    Surgical History:   She has a past surgical history that includes Carpal tunnel release (Right) and Ovarian cyst removal.    Family History:   Family History   Problem Relation Name Age of Onset    Arthritis Mother      Heart disease Father       Father had ASHD (60s- CABG)    Allergies:  Cefaclor, Cefuroxime, and Shellfish derived     Social History:   - Tobacco dependency (down  to 1/2 PPD; trying to quit); no alcohol; no illicit drug use  - Employed:  at [employer]  - 1 child (son)     Prior Cardiovascular Testing (personally reviewed):     Lipid panel (10/31/2024)- total 162, HDL 44, LDL 98, triglycerides 99 mg/dl    Holter (6/26/2024)- brief runs of SVT; no high grade AV block    NM stress testing (3/28/2024)  1. Normal stress myocardial perfusion imaging in response to  pharmacologic stress. No ischemic ECG changes  2. Well-maintained left ventricular function.  50%    TTE (3/24/2024)   1. Left ventricular systolic function is normal.   2. There is moderate to severe concentric left ventricular hypertrophy.   3. Spectral Doppler shows an impaired relaxation pattern of left ventricular diastolic filling.   4. Mildly elevated RVSP.    Holter Monitor (3/7/2024)  The predominant rhythm during Holter recording was sinus rhythm with a minimum heart rate of 47 bpm, average heart rate of 65 bpm; no episodes of atrial fibrillation; brief episodes of atrial tachycardia; no episodes of high-grade AV block; no VT.    ECG (12/8/2023)- sinus rhythm with sinus arrhythmia     Review of Systems:  Review of Systems   Constitutional: Negative.   HENT: Negative.     Eyes: Negative.    Cardiovascular:  Negative for chest pain, dyspnea on exertion, near-syncope, orthopnea, palpitations, paroxysmal nocturnal dyspnea and syncope.   Respiratory: Negative.          + acute cough in setting of recent bronchitis   Endocrine: Negative.    Hematologic/Lymphatic: Negative.    Skin: Negative.    Musculoskeletal: Negative.    Gastrointestinal: Negative.    Genitourinary: Negative.    Neurological: Negative.    Psychiatric/Behavioral: Negative.     Allergic/Immunologic: Negative.        Objective     Outpatient Medications:    Current Outpatient Medications:     amLODIPine (Norvasc) 5 mg tablet, Take 1 tablet (5 mg) by mouth once daily., Disp: 90 tablet, Rfl: 1    aspirin 81 mg EC tablet, Take 1 tablet (81 mg) by  mouth once daily., Disp: , Rfl:     atorvastatin (Lipitor) 40 mg tablet, Take 1 tablet (40 mg) by mouth once daily at bedtime., Disp: 90 tablet, Rfl: 2    lisinopril 40 mg tablet, Take 1 tablet (40 mg) by mouth once daily., Disp: 90 tablet, Rfl: 3    meclizine (Antivert) 12.5 mg tablet, Take 1 tablet (12.5 mg) by mouth 3 times a day as needed for dizziness., Disp: 30 tablet, Rfl: 3     Last Recorded Vitals  /80   Pulse 67   Resp 16   Wt 67.1 kg (148 lb)   SpO2 95%   BMI 23.18 kg/m²     Physical Exam:  Physical Exam  Constitutional:       General: She is not in acute distress.  HENT:      Head: Normocephalic.      Mouth/Throat:      Mouth: Mucous membranes are moist.   Eyes:      Extraocular Movements: Extraocular movements intact.      Conjunctiva/sclera: Conjunctivae normal.   Neck:      Vascular: No carotid bruit or JVD.   Cardiovascular:      Rate and Rhythm: Normal rate and regular rhythm.      Pulses: Normal pulses.      Heart sounds: No murmur heard.  Pulmonary:      Effort: Pulmonary effort is normal. No respiratory distress.      Breath sounds: Normal breath sounds.   Abdominal:      General: Bowel sounds are normal. There is no distension.      Palpations: Abdomen is soft.   Musculoskeletal:         General: No swelling.   Skin:     General: Skin is warm and dry.   Neurological:      General: No focal deficit present.      Mental Status: She is alert.      Cranial Nerves: No cranial nerve deficit.      Motor: No weakness.   Psychiatric:         Mood and Affect: Mood normal.         Behavior: Behavior normal.         Lab Review:    Lab Results   Component Value Date    GLUCOSE 90 11/21/2024    CALCIUM 9.5 11/21/2024     11/21/2024    K 4.0 11/21/2024    CO2 29 11/21/2024     11/21/2024    BUN 16 11/21/2024    CREATININE 0.62 11/21/2024       Lab Results   Component Value Date    WBC 6.9 11/21/2024    HGB 13.0 11/21/2024    HCT 40.1 11/21/2024    MCV 99 11/21/2024     11/21/2024        Lab Results   Component Value Date    CHOL 162 10/31/2024    CHOL 263 (H) 06/25/2024    CHOL 237 (H) 12/12/2023     Lab Results   Component Value Date    HDL 44.6 10/31/2024    HDL 52.1 06/25/2024    HDL 57.4 12/12/2023     Lab Results   Component Value Date    LDLCALC 98 10/31/2024    LDLCALC 184 (H) 06/25/2024    LDLCALC 144 (H) 12/12/2023     Lab Results   Component Value Date    TRIG 99 10/31/2024    TRIG 136 06/25/2024    TRIG 177 (H) 12/12/2023       Lab Results   Component Value Date    TSH 3.83 12/12/2023       Assessment:   64 y.o.  female who presents today for a follow-up for chest pains. Past medical history of HTN, DLD, GERD, hx falls, tobacco dependency.      Beth returned to cardiology clinic on 2/5/2025.  Holter monitor showed brief runs of atrial tachycardia.  Discussed option of beta blockade if symptoms poorly controlled; patient preferred to defer for now as symptoms were not overly bothersome. Dyslipidemia much improved on atorvastatin.  We will continue cardiac management as below.     Overall Plan:  1.  Chest pain (resolved)  - Pharmacologic nuclear medicine stress was low risk for ischemia  - Complete transthoracic echocardiogram showed normal LV systolic function; normal wall motion; + LVH  -Will defer additional testing at this time; if patient had recurrent chest pains would then consider coronary CTA    2.  Livedo reticularis (resolved)  - If symptoms return would then consider further evaluation vascular medicine clinic    3.  Tobacco dependency  - Strongly recommended tobacco cessation; patient is thinking about quitting but not ready to quit  - I personally counseled the patient for 3 minutes on tobacco cessation; patient not ready to quit (2/5/2025    4.  Dyslipidemia (goal LDL < 100 mg/dl; at goal)  - Improved control; continue atorvastatin  - Continue diet and lifestyle modifications    5.  Hypertension (goal BP < 130/90 mmHg; currently well-controlled)   - Continue  lisinopril, amlodipine    Disposition: Return to cardiology clinic in 12 months    Ascencion Sutton MD

## 2025-03-31 ENCOUNTER — APPOINTMENT (OUTPATIENT)
Dept: NEUROLOGY | Facility: CLINIC | Age: 65
End: 2025-03-31
Payer: COMMERCIAL

## 2025-05-30 DIAGNOSIS — I10 PRIMARY HYPERTENSION: ICD-10-CM

## 2025-05-30 RX ORDER — LISINOPRIL 40 MG/1
40 TABLET ORAL DAILY
Qty: 30 TABLET | Refills: 9 | Status: SHIPPED | OUTPATIENT
Start: 2025-05-30